# Patient Record
Sex: MALE | Race: OTHER | HISPANIC OR LATINO | Employment: OTHER | URBAN - METROPOLITAN AREA
[De-identification: names, ages, dates, MRNs, and addresses within clinical notes are randomized per-mention and may not be internally consistent; named-entity substitution may affect disease eponyms.]

---

## 2021-11-16 ENCOUNTER — APPOINTMENT (EMERGENCY)
Dept: RADIOLOGY | Facility: HOSPITAL | Age: 66
End: 2021-11-16

## 2021-11-16 ENCOUNTER — HOSPITAL ENCOUNTER (EMERGENCY)
Facility: HOSPITAL | Age: 66
Discharge: HOME | End: 2021-11-16
Attending: EMERGENCY MEDICINE

## 2021-11-16 VITALS
RESPIRATION RATE: 18 BRPM | HEART RATE: 61 BPM | HEIGHT: 66 IN | WEIGHT: 176 LBS | TEMPERATURE: 98.1 F | SYSTOLIC BLOOD PRESSURE: 146 MMHG | DIASTOLIC BLOOD PRESSURE: 71 MMHG | BODY MASS INDEX: 28.28 KG/M2 | OXYGEN SATURATION: 97 %

## 2021-11-16 DIAGNOSIS — M54.9 BACK PAIN: Primary | ICD-10-CM

## 2021-11-16 PROCEDURE — 72100 X-RAY EXAM L-S SPINE 2/3 VWS: CPT

## 2021-11-16 PROCEDURE — 96372 THER/PROPH/DIAG INJ SC/IM: CPT

## 2021-11-16 PROCEDURE — 99284 EMERGENCY DEPT VISIT MOD MDM: CPT | Performed by: EMERGENCY MEDICINE

## 2021-11-16 PROCEDURE — 99283 EMERGENCY DEPT VISIT LOW MDM: CPT

## 2021-11-16 RX ORDER — KETOROLAC TROMETHAMINE 30 MG/ML
15 INJECTION, SOLUTION INTRAMUSCULAR; INTRAVENOUS ONCE
Status: COMPLETED | OUTPATIENT
Start: 2021-11-16 | End: 2021-11-16

## 2021-11-16 RX ORDER — PREDNISONE 20 MG/1
40 TABLET ORAL ONCE
Status: COMPLETED | OUTPATIENT
Start: 2021-11-16 | End: 2021-11-16

## 2021-11-16 RX ORDER — PREDNISONE 20 MG/1
40 TABLET ORAL DAILY
Qty: 10 TABLET | Refills: 0 | Status: SHIPPED | OUTPATIENT
Start: 2021-11-16 | End: 2021-11-21

## 2021-11-16 RX ORDER — IBUPROFEN 800 MG/1
800 TABLET ORAL 3 TIMES DAILY
Qty: 21 TABLET | Refills: 0 | Status: SHIPPED | OUTPATIENT
Start: 2021-11-16

## 2021-11-16 RX ADMIN — PREDNISONE 40 MG: 20 TABLET ORAL at 18:08

## 2021-11-16 RX ADMIN — KETOROLAC TROMETHAMINE 15 MG: 30 INJECTION, SOLUTION INTRAMUSCULAR at 18:08

## 2021-11-18 ENCOUNTER — TELEPHONE (OUTPATIENT)
Dept: PHYSICAL THERAPY | Facility: OTHER | Age: 66
End: 2021-11-18

## 2021-11-22 ENCOUNTER — TELEPHONE (OUTPATIENT)
Dept: PHYSICAL THERAPY | Facility: OTHER | Age: 66
End: 2021-11-22

## 2022-10-25 ENCOUNTER — OFFICE VISIT (OUTPATIENT)
Dept: URGENT CARE | Facility: CLINIC | Age: 67
End: 2022-10-25

## 2022-10-25 VITALS
HEIGHT: 66 IN | HEART RATE: 72 BPM | SYSTOLIC BLOOD PRESSURE: 135 MMHG | BODY MASS INDEX: 28.28 KG/M2 | RESPIRATION RATE: 19 BRPM | OXYGEN SATURATION: 96 % | DIASTOLIC BLOOD PRESSURE: 85 MMHG | TEMPERATURE: 98.1 F | WEIGHT: 176 LBS

## 2022-10-25 DIAGNOSIS — M54.42 CHRONIC BILATERAL LOW BACK PAIN WITH BILATERAL SCIATICA: Primary | ICD-10-CM

## 2022-10-25 DIAGNOSIS — M54.41 CHRONIC BILATERAL LOW BACK PAIN WITH BILATERAL SCIATICA: Primary | ICD-10-CM

## 2022-10-25 DIAGNOSIS — G89.29 CHRONIC BILATERAL LOW BACK PAIN WITH BILATERAL SCIATICA: Primary | ICD-10-CM

## 2022-10-25 PROCEDURE — 99203 OFFICE O/P NEW LOW 30 MIN: CPT | Performed by: EMERGENCY MEDICINE

## 2022-10-25 RX ORDER — CYCLOBENZAPRINE HCL 5 MG
5 TABLET ORAL
Qty: 10 TABLET | Refills: 0 | Status: SHIPPED | OUTPATIENT
Start: 2022-10-25

## 2022-10-25 RX ORDER — GABAPENTIN 100 MG/1
100 CAPSULE ORAL 3 TIMES DAILY
Qty: 90 CAPSULE | Refills: 0 | Status: SHIPPED | OUTPATIENT
Start: 2022-10-25 | End: 2022-11-24

## 2022-10-25 RX ORDER — PREDNISONE 10 MG/1
TABLET ORAL
Qty: 40 TABLET | Refills: 0 | Status: SHIPPED | OUTPATIENT
Start: 2022-10-25

## 2022-10-25 RX ORDER — KETOROLAC TROMETHAMINE 30 MG/ML
15 INJECTION, SOLUTION INTRAMUSCULAR; INTRAVENOUS ONCE
Status: COMPLETED | OUTPATIENT
Start: 2022-10-25 | End: 2022-10-25

## 2022-10-25 RX ADMIN — KETOROLAC TROMETHAMINE 15 MG: 30 INJECTION, SOLUTION INTRAMUSCULAR; INTRAVENOUS at 13:34

## 2022-10-25 NOTE — PATIENT INSTRUCTIONS
Follow-up with comprehensive Spine program in 2-3 days - call the [facility] in [place] for an appointment  Ice 20 minutes at a time 3 to 4 times a day to low back and buttocks region  Take medicines as prescribed  Use a walker as needed  Call physical therapy place near you to schedule physical therapy 2 to 3 times a week for 6 weeks  If symptoms get worse, proceed immediately to ER

## 2022-10-25 NOTE — PROGRESS NOTES
[facility]        NAME: Claudean Kell is a 78 y o  male  : 1955    MRN: [id number]  DATE: 2022  TIME: 5:42 PM    Assessment and Plan   Chronic bilateral low back pain with bilateral sciatica [M54 42, M54 41, G89 29]  1  Chronic bilateral low back pain with bilateral sciatica  ketorolac (TORADOL) injection 15 mg    gabapentin (Neurontin) 100 mg capsule    cyclobenzaprine (FLEXERIL) 5 mg tablet    predniSONE 10 mg tablet    Walker     Patient was given an injection of IM Toradol  Patient denies any kidney problems or bleeding issues  Patient was not on any blood thinners  Patient was given a walker to assist with his ambulation  The  via the iPad was extremely helpful  Patient Instructions   Patient Instructions   1  Follow-up with comprehensive Spine program in 2-3 days - call the [facility] for an appointment  2  Ice 20 minutes at a time 3 to 4 times a day to low back and buttocks region  3  Take medicines as prescribed  4  Use a walker as needed  5  Call physical therapy place near you to schedule physical therapy 2 to 3 times a week for 6 weeks  6  If symptoms get worse, proceed immediately to ER  Follow up with PCP in 3-5 days  Proceed to  ER if symptoms worsen  Chief Complaint     Chief Complaint   Patient presents with   • Pain     Pt  went to [facility] for spinal surgery back in May 2022, for 2 herniated discs  Since then he has been unstable on his feet and constantly falling  Feels numbness and pain through spine, down bilateral legs into feet  Has not seen any other doctors since coming back to [place] in October  Pt is [place]ese speaking and an interpretor cart was used  History of Present Illness       19-year-old Croatian-speaking male presents with a chief complaint of pain in his low back radiating down his buttocks and down both of his legs into the soles of his feet    Patient went to [facility] in May and had spinal surgery at length to the [facility] and is now having increased pain in his back, buttocks and legs  Patient complains of numbness behind his calves radiating to the soles of his feet  Patient is not taking any medications  Patient only did 1 week ago physical therapy after his surgery  History was obtained through the  via iPad  Patient states he is having difficulty ambulating and sometimes leads to 1 side  Patient denies any stroke symptoms such as decreased vision difficulty speaking or weakness on 1 side versus the other  Patient denies any difficulty urinating and denies any incontinence from bladder and bowel  Patient states sometimes he will have rectal pain when the pain in the buttocks starts  Patient states prior to surgery he was having a lot of difficulty walking  Review of Systems   Review of Systems   Constitutional: Negative for chills and fever  HENT: Negative for congestion and rhinorrhea  Eyes: Negative for discharge and visual disturbance  Respiratory: Negative for shortness of breath and wheezing  Cardiovascular: Negative for chest pain and palpitations  Gastrointestinal: Negative for abdominal pain and vomiting  Endocrine: Negative for polydipsia and polyuria  Genitourinary: Negative for dysuria and hematuria  Musculoskeletal: Positive for arthralgias, back pain and gait problem  Negative for neck stiffness  Skin: Negative for rash and wound  Neurological: Negative for dizziness and headaches  Psychiatric/Behavioral: Negative for confusion and suicidal ideas           Current Medications       Current Outpatient Medications:   •  cyclobenzaprine (FLEXERIL) 5 mg tablet, Take 1 tablet (5 mg total) by mouth daily at bedtime, Disp: 10 tablet, Rfl: 0  •  gabapentin (Neurontin) 100 mg capsule, Take 1 capsule (100 mg total) by mouth 3 (three) times a day For numbness down the legs, Disp: 90 capsule, Rfl: 0  •  predniSONE 10 mg tablet, Take 4 pills x4 days, then 3 pills x4 days, then 2 pills x4 days, and then 1 pill x4 days, Disp: 40 tablet, Rfl: 0  •  ibuprofen (MOTRIN) 800 mg tablet, Take 1 tablet (800 mg total) by mouth 3 (three) times a day (Patient not taking: Reported on 10/25/2022), Disp: 21 tablet, Rfl: 0  No current facility-administered medications for this visit  Current Allergies     Allergies as of 10/25/2022   • (No Known Allergies)            The following portions of the patient's history were reviewed and updated as appropriate: allergies, current medications, past family history, past medical history, past social history, past surgical history and problem list      History reviewed  No pertinent past medical history  Past Surgical History:   Procedure Laterality Date   • SPINE SURGERY      herniated discs       History reviewed  No pertinent family history  Medications have been verified  Objective   /85   Pulse 72   Temp 98 1 °F (36 7 °C)   Resp 19   Ht 5' 6" (1 676 m)   Wt 79 8 kg (176 lb)   SpO2 96%   BMI 28 41 kg/m²        Physical Exam     Physical Exam  Vitals and nursing note reviewed  Constitutional:       Appearance: Normal appearance  Comments: 70-year-old male sitting in a wheelchair complaining of low back pain radiating to his buttocks and down both legs  HENT:      Head: Normocephalic and atraumatic  Nose: Nose normal    Eyes:      Extraocular Movements: Extraocular movements intact  Conjunctiva/sclera: Conjunctivae normal    Cardiovascular:      Rate and Rhythm: Normal rate  Pulmonary:      Effort: Pulmonary effort is normal    Musculoskeletal:         General: Normal range of motion  Cervical back: Neck supple  Comments: Patient has tenderness to the lower lumbar region and bilateral sciatic regions radiating down both legs  Pt  has pain when he elevates both legs  Patient is able to ambulate but leans forward and walks very slowly and short distances    Patient has numbness down both legs and into the soles of his feet  Patient states that all his toes are numb  Skin:     General: Skin is warm and dry  Comments: There is a 2 in linear incision to the mid back of the upper lumbar region that was due to his spinal surgery and is healing well  There is no signs of infection  Neurological:      General: No focal deficit present  Mental Status: He is alert and oriented to person, place, and time     Psychiatric:         Mood and Affect: Mood normal

## 2022-12-02 ENCOUNTER — EVALUATION (OUTPATIENT)
Dept: PHYSICAL THERAPY | Facility: CLINIC | Age: 67
End: 2022-12-02

## 2022-12-02 DIAGNOSIS — G89.29 CHRONIC BILATERAL LOW BACK PAIN WITHOUT SCIATICA: Primary | ICD-10-CM

## 2022-12-02 DIAGNOSIS — M54.50 CHRONIC BILATERAL LOW BACK PAIN WITHOUT SCIATICA: Primary | ICD-10-CM

## 2022-12-02 NOTE — LETTER
2022    Jorge Roman MD  [address] [facility] [address]    Patient: Demarco Mtz   YOB: 1955   Date of Visit: 2022     Encounter Diagnosis     ICD-10-CM    1  Chronic bilateral low back pain without sciatica  M54 50     G89 29           Dear Dr Juwan Daly: Thank you for your recent referral of Demarco Serum  Please review the attached evaluation summary from Armando's recent visit  Please verify that you agree with the plan of care by signing the attached order  If you have any questions or concerns, please do not hesitate to call  I sincerely appreciate the opportunity to share in the care of one of your patients and hope to have another opportunity to work with you in the near future  Sincerely,    Rita Medellin, PT      Referring Provider:      I certify that I have read the below Plan of Care and certify the need for these services furnished under this plan of treatment while under my care  Jorge Roman MD  [address] [facility] [address]  Via Fax: [phone]          PT Evaluation     Today's date: 2022  Patient name: Demarco Mtz  : 1955  MRN: [id number]  Referring provider: No ref  provider found  Dx:   Encounter Diagnosis     ICD-10-CM    1  Chronic bilateral low back pain without sciatica  M54 50     G89 29                      Assessment  Assessment details: Demarco Mtz is a 75 y o  male who presents to PT with primary c/o low back pain that has been a chronic issue, with hx of a spinae surgery about 6 months ago (unsure if fusion vs laminectomy)  Sister present for language translation  He presents today with decreased lumbar AROM, decreased core/glute strength, BLE symptoms with lumbar extension  Findings result in decreased standing tolerance, decreased activity tolerance, decreased recreational tolerance   He would benefit from skilled PT to address these in order to restore PLOF  Pt educated on related anatomy, posture/positioning, symptom presentation, findings, POC and verbalizes understanding  HEP provided this date and pt verbalizes understanding  They leave this IE with all current questions answered to their satisfaction  Impairments: abnormal gait, abnormal or restricted ROM, activity intolerance, impaired physical strength, lacks appropriate home exercise program, pain with function, poor posture  and poor body mechanics    Symptom irritability: moderateBarriers to therapy: language  Understanding of Dx/Px/POC: good   Prognosis: good    Goals  STG-in 4 weeks  1  Pain at worst 6/10  2  Pt able to walk community distances without BLE symptoms  3  Independent with basic HEP    LTG-by DC  1  Pain at worst 4/10  2  Lumbar AROM WFL and pain free  3  Decreased BLE symptoms by 50%  4  independent with comprehensive HEP  5   Increase FOTO to >/= expected by DC    Plan  Patient would benefit from: skilled physical therapy  Planned modality interventions: biofeedback, cryotherapy, electrical stimulation/Russian stimulation, iontophoresis, unattended electrical stimulation, ultrasound, traction, thermotherapy: paraffin bath, thermotherapy: hydrocollator packs, TENS and low level laser therapy  Planned therapy interventions: aquatic therapy, balance, body mechanics training, coordination, flexibility, functional ROM exercises, gait training, graded exercise, home exercise program, therapeutic exercise, therapeutic activities, stretching, strengthening, postural training, patient education, neuromuscular re-education, manual therapy and joint mobilization  Frequency: 2x week  Duration in weeks: 8  Plan of Care beginning date: 12/2/2022  Plan of Care expiration date: 1/27/2023  Treatment plan discussed with: patient        Subjective Evaluation    History of Present Illness  Mechanism of injury: Dairn Naranjo is a 75 y o  male who presents to PT with primary c/o low back pain  His sister is here for translation as pt primarily speaks [place]  Does have hx of spine surgery about 6 months ago  Still having some back pain now  Does c/o numbness/tingling in BLE into feet that is constant  Notes when sitting, pain goes into B buttocks  Notes it feels better when he is walking around, but after while his legs get heavy and become uncomfortable  Denies sleep disturbance  Did have some PT prior to surgery, but none since     Pain  Current pain ratin  At best pain ratin  At worst pain rating: 10  Location: L/S  Quality: dull ache and radiating  Relieving factors: change in position  Aggravating factors: standing, walking and sitting  Progression: no change    Treatments  Current treatment: physical therapy  Patient Goals  Patient goals for therapy: decreased edema, decreased pain, improved balance, increased motion, increased strength, independence with ADLs/IADLs and return to sport/leisure activities  Patient goal: restore PLOF        Objective     Postural Observations  Seated posture: fair  Standing posture: fair        Active Range of Motion     Lumbar   Flexion: 50 degrees   Extension: 10 degrees  with pain  Left lateral flexion: 20 degrees    with pain  Right lateral flexion: 10 degrees  with pain    Additional Active Range of Motion Details  Increased BLE sx with lumbar extension    Strength/Myotome Testing     Left Hip   Planes of Motion   Flexion: 4+  Extension: 4  Abduction: 4+  Adduction: 4+    Right Hip   Planes of Motion   Flexion: 4+  Extension: 4  Abduction: 4+  Adduction: 4+    Left Knee   Flexion: 4+  Extension: 4+    Right Knee   Flexion: 4+  Extension: 4+    General Comments:      Lumbar Comments  Decreased core/glute strength            Precautions: hx spins surgery about 6 months ago (unsure what kind)      RE:   EPOC:              Manuals                                                                 Neuro Re-Ed Ther Ex             Bike for ROM             TrA brace 3"x10            HL hip add 3"x10            HL hip abd RTB 3"x10            SLR             Lumbar rollout             Row/LPD             pallof press             STS                                       Pt edu/HEP MS            Ther Activity                                       Gait Training                                       Modalities

## 2022-12-02 NOTE — PROGRESS NOTES
PT Evaluation     Today's date: 2022  Patient name: Kaykay Avila  : 1955  MRN: [id number]  Referring provider: No ref  provider found  Dx:   Encounter Diagnosis     ICD-10-CM    1  Chronic bilateral low back pain without sciatica  M54 50     G89 29                      Assessment  Assessment details: Kaykay Avila is a 75 y o  male who presents to PT with primary c/o low back pain that has been a chronic issue, with hx of a spinae surgery about 6 months ago (unsure if fusion vs laminectomy)  Sister present for language translation  He presents today with decreased lumbar AROM, decreased core/glute strength, BLE symptoms with lumbar extension  Findings result in decreased standing tolerance, decreased activity tolerance, decreased recreational tolerance  He would benefit from skilled PT to address these in order to restore PLOF  Pt educated on related anatomy, posture/positioning, symptom presentation, findings, POC and verbalizes understanding  HEP provided this date and pt verbalizes understanding  They leave this IE with all current questions answered to their satisfaction  Impairments: abnormal gait, abnormal or restricted ROM, activity intolerance, impaired physical strength, lacks appropriate home exercise program, pain with function, poor posture  and poor body mechanics    Symptom irritability: moderateBarriers to therapy: language  Understanding of Dx/Px/POC: good   Prognosis: good    Goals  STG-in 4 weeks  1  Pain at worst 6/10  2  Pt able to walk community distances without BLE symptoms  3  Independent with basic HEP    LTG-by DC  1  Pain at worst 4/10  2  Lumbar AROM WFL and pain free  3  Decreased BLE symptoms by 50%  4  independent with comprehensive HEP  5   Increase FOTO to >/= expected by DC    Plan  Patient would benefit from: skilled physical therapy  Planned modality interventions: biofeedback, cryotherapy, electrical stimulation/Russian stimulation, iontophoresis, unattended electrical stimulation, ultrasound, traction, thermotherapy: paraffin bath, thermotherapy: hydrocollator packs, TENS and low level laser therapy  Planned therapy interventions: aquatic therapy, balance, body mechanics training, coordination, flexibility, functional ROM exercises, gait training, graded exercise, home exercise program, therapeutic exercise, therapeutic activities, stretching, strengthening, postural training, patient education, neuromuscular re-education, manual therapy and joint mobilization  Frequency: 2x week  Duration in weeks: 8  Plan of Care beginning date: 2022  Plan of Care expiration date: 2023  Treatment plan discussed with: patient        Subjective Evaluation    History of Present Illness  Mechanism of injury: Estella Eisenmenger is a 75 y o  male who presents to PT with primary c/o low back pain  His sister is here for translation as pt primarily speaks [facility] [place]  Does have hx of spine surgery about 6 months ago  Still having some back pain now  Does c/o numbness/tingling in BLE into feet that is constant  Notes when sitting, pain goes into B buttocks  Notes it feels better when he is walking around, but after while his legs get heavy and become uncomfortable  Denies sleep disturbance  Did have some PT prior to surgery, but none since     Pain  Current pain ratin  At best pain ratin  At worst pain rating: 10  Location: L/S  Quality: dull ache and radiating  Relieving factors: change in position  Aggravating factors: standing, walking and sitting  Progression: no change    Treatments  Current treatment: physical therapy  Patient Goals  Patient goals for therapy: decreased edema, decreased pain, improved balance, increased motion, increased strength, independence with ADLs/IADLs and return to sport/leisure activities  Patient goal: restore PLOF        Objective     Postural Observations  Seated posture: fair  Standing posture: fair        Active Range of Motion     Lumbar   Flexion: 50 degrees   Extension: 10 degrees  with pain  Left lateral flexion: 20 degrees    with pain  Right lateral flexion: 10 degrees  with pain    Additional Active Range of Motion Details  Increased BLE sx with lumbar extension    Strength/Myotome Testing     Left Hip   Planes of Motion   Flexion: 4+  Extension: 4  Abduction: 4+  Adduction: 4+    Right Hip   Planes of Motion   Flexion: 4+  Extension: 4  Abduction: 4+  Adduction: 4+    Left Knee   Flexion: 4+  Extension: 4+    Right Knee   Flexion: 4+  Extension: 4+    General Comments:      Lumbar Comments  Decreased core/glute strength             Precautions: hx spins surgery about 6 months ago (unsure what kind)      RE: 12/30  EPOC: 1/27             Manuals 12/2                                                                Neuro Re-Ed                                                                                                        Ther Ex             Bike for ROM             TrA brace 3"x10            HL hip add 3"x10            HL hip abd RTB 3"x10            SLR             Lumbar rollout             Row/LPD             pallof press             STS                                       Pt edu/HEP MS            Ther Activity                                       Gait Training                                       Modalities

## 2022-12-07 ENCOUNTER — OFFICE VISIT (OUTPATIENT)
Dept: PHYSICAL THERAPY | Facility: CLINIC | Age: 67
End: 2022-12-07

## 2022-12-07 DIAGNOSIS — M54.50 CHRONIC BILATERAL LOW BACK PAIN WITHOUT SCIATICA: Primary | ICD-10-CM

## 2022-12-07 DIAGNOSIS — G89.29 CHRONIC BILATERAL LOW BACK PAIN WITHOUT SCIATICA: Primary | ICD-10-CM

## 2022-12-07 NOTE — PROGRESS NOTES
Daily Note     Today's date: 2022  Patient name: Rigo Billings  : 1955  MRN: [id number]  Referring provider: Cholo Hdez MD  Dx:   Encounter Diagnosis     ICD-10-CM    1  Chronic bilateral low back pain without sciatica  M54 50     G89 29                      Subjective: Pt states he has only a little bit of pain today      Objective: See treatment diary below      Assessment: Tolerated treatment well  Patient demonstrated fatigue post treatment and would benefit from continued PT  TEP initiated this date as outlined below with focus on spinal stability training/core strengthening  No c/o pain throughout, nor observed  Pt denies any pain upon conclusion of session  Plan to assess response next session and progress as tolerated  Plan: Continue per plan of care  Progress treatment as tolerated         Precautions: hx spine surgery about 6 months ago (unsure what kind)      RE:   EPOC:              Manuals                                                                Neuro Re-Ed                                                                                                        Ther Ex             Bike for ROM  5' L1           TrA brace 3"x10 3"x10           HL hip add 3"x10 5"x20           HL hip abd RTB 3"x10 RTB 5"x20 ea           SLR  2x10 ea           Lumbar rollout  1 way only 10"x10           Row/LPD  GTB 2x10 ea           pallof press             STS  nv                                     Pt edu/HEP MS            Ther Activity                                       Gait Training                                       Modalities

## 2022-12-09 ENCOUNTER — OFFICE VISIT (OUTPATIENT)
Dept: PHYSICAL THERAPY | Facility: CLINIC | Age: 67
End: 2022-12-09

## 2022-12-09 DIAGNOSIS — M54.50 CHRONIC BILATERAL LOW BACK PAIN WITHOUT SCIATICA: Primary | ICD-10-CM

## 2022-12-09 DIAGNOSIS — G89.29 CHRONIC BILATERAL LOW BACK PAIN WITHOUT SCIATICA: Primary | ICD-10-CM

## 2022-12-09 NOTE — PROGRESS NOTES
Daily Note     Today's date: 2022  Patient name: Safia Gatica  : 1955  MRN: [id number]  Referring provider: Dennis Haddad MD  Dx:   Encounter Diagnosis     ICD-10-CM    1  Chronic bilateral low back pain without sciatica  M54 50     G89 29                      Subjective: Pt states he only has a little bit of back pain today      Objective: See treatment diary below      Assessment: Tolerated treatment well  Patient demonstrated fatigue post treatment and would benefit from continued PT  Able to add in STS for functional strengthening and standing hip 3 way for core stability with BLE movement  Pt does respond well to verbal and visual cueing for proper form  No complaints offered or observed throughout  Progress as tolerated  Plan: Continue per plan of care  Progress treatment as tolerated         Precautions: hx spine surgery about 6 months ago (unsure what kind)      RE:   EPOC:              Manuals                                                               Neuro Re-Ed                                                                                                        Ther Ex             Bike for ROM  5' L1 5'           TrA brace 3"x10 3"x10 3"x20          HL hip add 3"x10 5"x20 5"x20          HL hip abd RTB 3"x10 RTB 5"x20 ea RTB 5"x20 ea          SLR  2x10 ea 2x10 ea          HS stretch   W/ strap 30"x4          Lumbar rollout  1 way only 10"x10 1 way 10"x10          Row/LPD  GTB 2x10 ea GTB 3x10 ea          pallof press   nv          STS  nv lowmat 2x10          Standing hip 3 way   x10 ea                       Pt edu/HEP MS            Ther Activity                                       Gait Training                                       Modalities

## 2022-12-14 ENCOUNTER — OFFICE VISIT (OUTPATIENT)
Dept: PHYSICAL THERAPY | Facility: CLINIC | Age: 67
End: 2022-12-14

## 2022-12-14 DIAGNOSIS — M54.50 CHRONIC BILATERAL LOW BACK PAIN WITHOUT SCIATICA: Primary | ICD-10-CM

## 2022-12-14 DIAGNOSIS — G89.29 CHRONIC BILATERAL LOW BACK PAIN WITHOUT SCIATICA: Primary | ICD-10-CM

## 2022-12-14 NOTE — PROGRESS NOTES
Daily Note     Today's date: 2022  Patient name: Rafiq Frost  : 1955  MRN: [id number]  Referring provider: Shereen Uriarte MD  Dx:   Encounter Diagnosis     ICD-10-CM    1  Chronic bilateral low back pain without sciatica  M54 50     G89 29                      Subjective: Pt with no new reports      Objective: See treatment diary below      Assessment: Tolerated treatment well  Patient demonstrated fatigue post treatment and would benefit from continued PT  Addition of pallof press for anti-rotary stability  Pt able to exhibit improved core activation throughout session with decreased cueing required  Good tolerance to progressions of increased reps  Continue to progress as able  Plan: Continue per plan of care  Progress treatment as tolerated         Precautions: hx spine surgery about 6 months ago (unsure what kind)      RE:   EPOC:              Manuals                                                              Neuro Re-Ed                                                                                                        Ther Ex             Bike for ROM  5' L1 5'  5'         TrA brace 3"x10 3"x10 3"x20 3"x20         HL hip add 3"x10 5"x20 5"x20 5"x30         HL hip abd RTB 3"x10 RTB 5"x20 ea RTB 5"x20 ea GTB 5"x20 ea         SLR  2x10 ea 2x10 ea 2x10 ea         HS stretch   W/ strap 30"x4 W/ strap 30"X4         Lumbar rollout  1 way only 10"x10 1 way 10"x10 1 way 10"x10         Row/LPD  GTB 2x10 ea GTB 3x10 ea GTB 3x10 ea         pallof press   nv 10"x10 ea 1 GTB         STS  nv lowmat 2x10 lowmat 2x10         Standing hip 3 way   x10 ea x10 ea, ea leg                      Pt edu/HEP MS            Ther Activity                                       Gait Training                                       Modalities

## 2022-12-16 ENCOUNTER — OFFICE VISIT (OUTPATIENT)
Dept: PHYSICAL THERAPY | Facility: CLINIC | Age: 67
End: 2022-12-16

## 2022-12-16 DIAGNOSIS — G89.29 CHRONIC BILATERAL LOW BACK PAIN WITHOUT SCIATICA: Primary | ICD-10-CM

## 2022-12-16 DIAGNOSIS — M54.50 CHRONIC BILATERAL LOW BACK PAIN WITHOUT SCIATICA: Primary | ICD-10-CM

## 2022-12-16 NOTE — PROGRESS NOTES
Daily Note     Today's date: 2022  Patient name: Timur Soto  : 1955  MRN: [id number]  Referring provider: Cherelle Zhao MD  Dx:   Encounter Diagnosis     ICD-10-CM    1  Chronic bilateral low back pain without sciatica  M54 50     G89 29           Start Time: 1228  Stop Time: 1313  Total time in clinic (min): 45 minutes    Subjective: patient denies any new incidents or complaints  Denies any LB pain  Objective: See treatment diary below      Assessment: patient required cueing throughout along with demonstration on exercise sequencing, positioning, and mechanics due to language barrier  Was able to complete without onset of reported pain    Plan: Continue per plan of care  Progress treatment as tolerated         Precautions: hx spine surgery about 6 months ago (unsure what kind)      RE:   EPOC:          Manuals                                         Neuro Re-Ed                                                                        Ther Ex         Bike for ROM  5' L1 5'  5' L1 x 5 mins    TrA brace 3"x10 3"x10 3"x20 3"x20 5"x 10    HL hip add 3"x10 5"x20 5"x20 5"x30 5"x20    HL hip abd RTB 3"x10 RTB 5"x20 ea RTB 5"x20 ea GTB 5"x20 ea GTB 5"x30    SLR  2x10 ea 2x10 ea 2x10 ea 2x10 ea     HS stretch   W/ strap 30"x4 W/ strap 30"X4 W/Strap 30"x4     Lumbar rollout  1 way only 10"x10 1 way 10"x10 1 way 10"x10     Row/LPD  GTB 2x10 ea GTB 3x10 ea GTB 3x10 ea GTB 3x10 ea     pallof press   nv 10"x10 ea 1 GTB x20 ea     STS  nv lowmat 2x10 lowmat 2x10 Low mat 2x10    Standing hip 3 way   x10 ea x10 ea, ea leg X10 ea BL             Pt edu/HEP MS        Ther Activity                           Gait Training                           Modalities

## 2022-12-19 ENCOUNTER — OFFICE VISIT (OUTPATIENT)
Dept: PHYSICAL THERAPY | Facility: CLINIC | Age: 67
End: 2022-12-19

## 2022-12-19 DIAGNOSIS — G89.29 CHRONIC BILATERAL LOW BACK PAIN WITHOUT SCIATICA: Primary | ICD-10-CM

## 2022-12-19 DIAGNOSIS — M54.50 CHRONIC BILATERAL LOW BACK PAIN WITHOUT SCIATICA: Primary | ICD-10-CM

## 2022-12-19 NOTE — PROGRESS NOTES
Daily Note     Today's date: 2022  Patient name: Marcello Schmidt  : 1955  MRN: [id number]  Referring provider: Albert Flynn MD  Dx:   Encounter Diagnosis     ICD-10-CM    1  Chronic bilateral low back pain without sciatica  M54 50     G89 29                      Subjective: Patient offers no new complaints  Objective: See treatment diary below      Assessment: Tolerated treatment well  Patient does well with demonstration exhibiting good carryover  No pain or discomfort reported during session  Patient demonstrated fatigue post treatment and would benefit from continued PT      Plan: Progress treatment as tolerated         Precautions: hx spine surgery about 6 months ago (unsure what kind)      RE:   EPOC:          Manuals                                        Neuro Re-Ed                                                                        Ther Ex        Bike for ROM  5' L1 5'  5' L1 x 5 mins L1 x 5 min   TrA brace 3"x10 3"x10 3"x20 3"x20 5"x 10 10"x10    TrA brace + marches      NV   HL hip add 3"x10 5"x20 5"x20 5"x30 5"x20 5"x30    HL hip abd RTB 3"x10 RTB 5"x20 ea RTB 5"x20 ea GTB 5"x20 ea GTB 5"x30 GTB 5"x20ea   SLR  2x10 ea 2x10 ea 2x10 ea 2x10 ea  2x10 ea   HS stretch   W/ strap 30"x4 W/ strap 30"X4 W/Strap 30"x4  W/ strap 30"x4    Lumbar rollout  1 way only 10"x10 1 way 10"x10 1 way 10"x10     Row/LPD  GTB 2x10 ea GTB 3x10 ea GTB 3x10 ea GTB 3x10 ea  BTB 2x10 ea    pallof press   nv 10"x10 ea 1 GTB x20 ea  1 GTB 5"x20    STS  nv lowmat 2x10 lowmat 2x10 Low mat 2x10 Low mat 2x10   Standing hip 3 way   x10 ea x10 ea, ea leg X10 ea BL X 10 ea bilat  YTB             Pt edu/HEP MS        Ther Activity                           Gait Training                           Modalities

## 2022-12-21 ENCOUNTER — OFFICE VISIT (OUTPATIENT)
Dept: PHYSICAL THERAPY | Facility: CLINIC | Age: 67
End: 2022-12-21

## 2022-12-21 DIAGNOSIS — G89.29 CHRONIC BILATERAL LOW BACK PAIN WITHOUT SCIATICA: Primary | ICD-10-CM

## 2022-12-21 DIAGNOSIS — M54.50 CHRONIC BILATERAL LOW BACK PAIN WITHOUT SCIATICA: Primary | ICD-10-CM

## 2022-12-28 ENCOUNTER — OFFICE VISIT (OUTPATIENT)
Dept: PHYSICAL THERAPY | Facility: CLINIC | Age: 67
End: 2022-12-28

## 2022-12-28 DIAGNOSIS — M54.50 CHRONIC BILATERAL LOW BACK PAIN WITHOUT SCIATICA: Primary | ICD-10-CM

## 2022-12-28 DIAGNOSIS — G89.29 CHRONIC BILATERAL LOW BACK PAIN WITHOUT SCIATICA: Primary | ICD-10-CM

## 2022-12-28 NOTE — PROGRESS NOTES
Daily Note     Today's date: 2022  Patient name: Yvonne Cronin  : 1955  MRN: [id number]  Referring provider: Ni Dugan MD  Dx:   Encounter Diagnosis     ICD-10-CM    1  Chronic bilateral low back pain without sciatica  M54 50     G89 29                      Subjective: Pt denies any back pain upon arrival       Objective: See treatment diary below      Assessment: Tolerated treatment well  Patient demonstrated fatigue post treatment and would benefit from continued PT  No complaints offered or observed throughout  Re-eval planned for next visit and discussed potential for DC if he is still feeling well  Plan: Continue per plan of care  Progress treatment as tolerated         Precautions: hx spine surgery about 6 months ago (unsure what kind)      RE:   EPOC:          Manuals                                        Neuro Re-Ed                                                                        Ther Ex        Bike for ROM L1 x5' L1 x5'  5' L1 x 5 mins L1 x 5 min   TrA brace 10"x10 10"x10  3"x20 5"x 10 10"x10    TrA brace + marches x10 ea LE x20 ea LE    NV   HL hip add 10"x15 10"x20  5"x30 5"x20 5"x30    HL hip abd GTB 5"x30 ea GTB 5"x30 ea  GTB 5"x20 ea GTB 5"x30 GTB 5"x20ea   SLR 2x10 ea 2x10 ea  2x10 ea 2x10 ea  2x10 ea   HS stretch W/ strap 30"x4   W/ strap 30"X4 W/Strap 30"x4  W/ strap 30"x4    Lumbar rollout    1 way 10"x10     Row/LPD BTB 3x10 ea BTB 3x10 ea  GTB 3x10 ea GTB 3x10 ea  BTB 2x10 ea    pallof press GTB 10"x10 ea   10"x10 ea 1 GTB x20 ea  1 GTB 5"x20    STS 2x10 lowmat 2x10 lowmat  lowmat 2x10 Low mat 2x10 Low mat 2x10   Standing hip 3 way YTB x15 ea, ea LE   x10 ea, ea leg X10 ea BL X 10 ea bilat  YTB    Resisted side step  RTB x3 laps       Pt edu/HEP         Ther Activity                           Gait Training                           Modalities

## 2022-12-30 ENCOUNTER — APPOINTMENT (OUTPATIENT)
Dept: PHYSICAL THERAPY | Facility: CLINIC | Age: 67
End: 2022-12-30

## 2022-12-30 NOTE — PROGRESS NOTES
PT Re-Evaluation     Today's date: 2022  Patient name: Zenobia Harris  : 1955  MRN: [id number]  Referring provider: Vidhya Cintron MD  Dx:   No diagnosis found  Assessment  Assessment details:  Re-Eval:      INITIAL:Armando Gómez is a 75 y o  male who presents to PT with primary c/o low back pain that has been a chronic issue, with hx of a spinae surgery about 6 months ago (unsure if fusion vs laminectomy)  Sister present for language translation  He presents today with decreased lumbar AROM, decreased core/glute strength, BLE symptoms with lumbar extension  Findings result in decreased standing tolerance, decreased activity tolerance, decreased recreational tolerance  He would benefit from skilled PT to address these in order to restore PLOF  Pt educated on related anatomy, posture/positioning, symptom presentation, findings, POC and verbalizes understanding  HEP provided this date and pt verbalizes understanding  They leave this IE with all current questions answered to their satisfaction  Impairments: abnormal gait, abnormal or restricted ROM, activity intolerance, impaired physical strength, lacks appropriate home exercise program, pain with function, poor posture  and poor body mechanics    Symptom irritability: moderateBarriers to therapy: language  Understanding of Dx/Px/POC: good   Prognosis: good    Goals  STG-in 4 weeks  1  Pain at worst 6/10  2  Pt able to walk community distances without BLE symptoms  3  Independent with basic HEP    LTG-by DC  1  Pain at worst 4/10  2  Lumbar AROM WFL and pain free  3  Decreased BLE symptoms by 50%  4  independent with comprehensive HEP  5   Increase FOTO to >/= expected by DC    Plan  Patient would benefit from: skilled physical therapy  Planned modality interventions: biofeedback, cryotherapy, electrical stimulation/Russian stimulation, iontophoresis, unattended electrical stimulation, ultrasound, traction, thermotherapy: paraffin bath, thermotherapy: hydrocollator packs, TENS and low level laser therapy  Planned therapy interventions: aquatic therapy, balance, body mechanics training, coordination, flexibility, functional ROM exercises, gait training, graded exercise, home exercise program, therapeutic exercise, therapeutic activities, stretching, strengthening, postural training, patient education, neuromuscular re-education, manual therapy and joint mobilization  Frequency: 2x week  Duration in weeks: 8  Plan of Care beginning date: 2022  Plan of Care expiration date: 2023  Treatment plan discussed with: patient        Subjective Evaluation    History of Present Illness  Mechanism of injury:  Re-Eval:      INITIAL:Armando Fierro is a 75 y o  male who presents to PT with primary c/o low back pain  His sister is here for translation as pt primarily speaks [address]  Does have hx of spine surgery about 6 months ago  Still having some back pain now  Does c/o numbness/tingling in BLE into feet that is constant  Notes when sitting, pain goes into B buttocks  Notes it feels better when he is walking around, but after while his legs get heavy and become uncomfortable  Denies sleep disturbance  Did have some PT prior to surgery, but none since     Pain  Current pain ratin  At best pain ratin  At worst pain rating: 10  Location: L/S  Quality: dull ache and radiating  Relieving factors: change in position  Aggravating factors: standing, walking and sitting  Progression: no change    Treatments  Current treatment: physical therapy  Patient Goals  Patient goals for therapy: decreased edema, decreased pain, improved balance, increased motion, increased strength, independence with ADLs/IADLs and return to sport/leisure activities  Patient goal: restore PLOF        Objective     Postural Observations  Seated posture: fair  Standing posture: fair        Active Range of Motion     Lumbar   Flexion: 50 degrees Extension: 10 degrees  with pain  Left lateral flexion: 20 degrees    with pain  Right lateral flexion: 10 degrees  with pain    Additional Active Range of Motion Details  Increased BLE sx with lumbar extension    Strength/Myotome Testing     Left Hip   Planes of Motion   Flexion: 4+  Extension: 4  Abduction: 4+  Adduction: 4+    Right Hip   Planes of Motion   Flexion: 4+  Extension: 4  Abduction: 4+  Adduction: 4+    Left Knee   Flexion: 4+  Extension: 4+    Right Knee   Flexion: 4+  Extension: 4+    General Comments:      Lumbar Comments  Decreased core/glute strength             Precautions: hx spins surgery about 6 months ago (unsure what kind)        RE: 12/30  EPOC: 1/27         Manuals 12/21 12/28 12/30                                          Neuro Re-Ed                                                                        Ther Ex         Bike for ROM L1 x5' L1 x5'       TrA brace 10"x10 10"x10       TrA brace + marches x10 ea LE x20 ea LE       HL hip add 10"x15 10"x20       HL hip abd GTB 5"x30 ea GTB 5"x30 ea       SLR 2x10 ea 2x10 ea       HS stretch W/ strap 30"x4        Lumbar rollout         Row/LPD BTB 3x10 ea BTB 3x10 ea       pallof press GTB 10"x10 ea        STS 2x10 lowmat 2x10 lowmat       Standing hip 3 way YTB x15 ea, ea LE        Resisted side step  RTB x3 laps       Pt edu/HEP         Ther Activity                           Gait Training                           Modalities

## 2023-01-04 ENCOUNTER — EVALUATION (OUTPATIENT)
Dept: PHYSICAL THERAPY | Facility: CLINIC | Age: 68
End: 2023-01-04

## 2023-01-04 DIAGNOSIS — M54.50 CHRONIC BILATERAL LOW BACK PAIN WITHOUT SCIATICA: Primary | ICD-10-CM

## 2023-01-04 DIAGNOSIS — G89.29 CHRONIC BILATERAL LOW BACK PAIN WITHOUT SCIATICA: Primary | ICD-10-CM

## 2023-01-04 NOTE — LETTER
2023    Gilda Watts MD  [address] [facility] [address]    Patient: Brittaney Barron   YOB: 1955   Date of Visit: 2023     Encounter Diagnosis     ICD-10-CM    1  Chronic bilateral low back pain without sciatica  M54 50     G89 29           Dear Dr Rodney Rubio: Thank you for your recent referral of Brittaney Barron  Please review the attached evaluation summary from Armando's recent visit  Please verify that you agree with the plan of care by signing the attached order  If you have any questions or concerns, please do not hesitate to call  I sincerely appreciate the opportunity to share in the care of one of your patients and hope to have another opportunity to work with you in the near future  Sincerely,    Neil Valera, PT      Referring Provider:      I certify that I have read the below Plan of Care and certify the need for these services furnished under this plan of treatment while under my care  Gilda Watts MD  [address] [facility] [address]  Via Fax: [phone]          PT Re-Evaluation  and PT Discharge    Today's date: 2023  Patient name: Brittaney Barron  : 1955  MRN: [id number]  Referring provider: Shannan Arndt MD  Dx:   Encounter Diagnosis     ICD-10-CM    1  Chronic bilateral low back pain without sciatica  M54 50     G89 29                      Assessment  Assessment details:  Re-Eval/DISCHARGE: Pt reports 90% improvement over his course of care  He denies any recent back pain, but still has continued B foot numbness  Improved lumbar AROM and core strength/BLE gross strength  Improved FOTO score to 29 from 1 at IE (with predicted score of 30) also indicative of functional improvements made  He is appropriate and agreeable to DC this date secondary to plateau in progress, goal achievement  DC to HEP    used during this to reduce miscommunication and improve pt understanding      INITIAL:Armando Morataya is a 75 y o  male who presents to PT with primary c/o low back pain that has been a chronic issue, with hx of a spinae surgery about 6 months ago (unsure if fusion vs laminectomy)  Sister present for language translation  He presents today with decreased lumbar AROM, decreased core/glute strength, BLE symptoms with lumbar extension  Findings result in decreased standing tolerance, decreased activity tolerance, decreased recreational tolerance  He would benefit from skilled PT to address these in order to restore PLOF  Pt educated on related anatomy, posture/positioning, symptom presentation, findings, POC and verbalizes understanding  HEP provided this date and pt verbalizes understanding  They leave this IE with all current questions answered to their satisfaction  Impairments: abnormal gait, abnormal or restricted ROM, activity intolerance, impaired physical strength, lacks appropriate home exercise program, pain with function, poor posture  and poor body mechanics    Symptom irritability: moderateBarriers to therapy: language  Understanding of Dx/Px/POC: good   Prognosis: good    Goals  STG-in 4 weeks  1  Pain at worst 6/10-met  2  Pt able to walk community distances without BLE symptoms-met  3  Independent with basic HEP-met    LTG-by DC  1  Pain at worst 4/10-met  2  Lumbar AROM WFL and pain free-met  3  Decreased BLE symptoms by 50%-ongoing  4  independent with comprehensive HEP-met  5   Increase FOTO to >/= expected by DC-mostly met    Plan  Plan details: DC 1/4/23  Patient would benefit from: skilled physical therapy  Planned modality interventions: biofeedback, cryotherapy, electrical stimulation/Russian stimulation, iontophoresis, unattended electrical stimulation, ultrasound, traction, thermotherapy: paraffin bath, thermotherapy: hydrocollator packs, TENS and low level laser therapy  Planned therapy interventions: aquatic therapy, balance, body mechanics training, coordination, flexibility, functional ROM exercises, gait training, graded exercise, home exercise program, therapeutic exercise, therapeutic activities, stretching, strengthening, postural training, patient education, neuromuscular re-education, manual therapy and joint mobilization  Frequency: 2x week  Duration in weeks: 8  Plan of Care beginning date: 2022  Plan of Care expiration date: 2023  Treatment plan discussed with: patient        Subjective Evaluation    History of Present Illness  Mechanism of injury:  Re-Eval: Pt denies any back pain as of late  Does still have some numbness/tingling in B feet  Reports he sees the physician again on  for f/u and will be getting an EMG 23  INITIAL:Armando Le is a 75 y o  male who presents to PT with primary c/o low back pain  His sister is here for translation as pt primarily speaks [address]  Does have hx of spine surgery about 6 months ago  Still having some back pain now  Does c/o numbness/tingling in BLE into feet that is constant  Notes when sitting, pain goes into B buttocks  Notes it feels better when he is walking around, but after while his legs get heavy and become uncomfortable  Denies sleep disturbance  Did have some PT prior to surgery, but none since     Pain  Current pain ratin  At best pain ratin  At worst pain ratin  Location: L/S  Quality: dull ache and radiating  Relieving factors: change in position  Aggravating factors: standing, walking and sitting  Progression: no change    Treatments  Current treatment: physical therapy  Patient Goals  Patient goals for therapy: decreased edema, decreased pain, improved balance, increased motion, increased strength, independence with ADLs/IADLs and return to sport/leisure activities  Patient goal: restore PLOF        Objective     Postural Observations  Seated posture: fair  Standing posture: fair        Active Range of Motion     Lumbar   Flexion: 90 degrees   Extension: 20 degrees   Left lateral flexion: 30 degrees       Right lateral flexion: 30 degrees     Strength/Myotome Testing     Left Hip   Planes of Motion   Flexion: 5  Extension: 4+  Abduction: 5  Adduction: 5    Right Hip   Planes of Motion   Flexion: 5  Extension: 4+  Abduction: 5  Adduction: 5    Left Knee   Flexion: 4+  Extension: 5    Right Knee   Flexion: 5  Extension: 5    General Comments:      Lumbar Comments  Decreased core/glute strength-improved            Precautions: hx spins surgery about 6 months ago (unsure what kind)        RE: 12/30  EPOC: 1/27         Manuals 12/21 12/28 1/4 RE/DC                                          Neuro Re-Ed                                                                        Ther Ex         Bike for ROM L1 x5' L1 x5'       TrA brace 10"x10 10"x10       TrA brace + marches x10 ea LE x20 ea LE       HL hip add 10"x15 10"x20       HL hip abd GTB 5"x30 ea GTB 5"x30 ea       SLR 2x10 ea 2x10 ea       HS stretch W/ strap 30"x4        Lumbar rollout         Row/LPD BTB 3x10 ea BTB 3x10 ea       pallof press GTB 10"x10 ea        STS 2x10 lowmat 2x10 lowmat       Standing hip 3 way YTB x15 ea, ea LE        Resisted side step  RTB x3 laps       Pt edu/HEP   MS-RE/DC, FOTO, subj/obj      Ther Activity                           Gait Training                           Modalities

## 2023-01-04 NOTE — PROGRESS NOTES
PT Re-Evaluation  and PT Discharge    Today's date: 2023  Patient name: Karyle Malone  : 1955  MRN: [id number]  Referring provider: Darren Zimmerman MD  Dx:   Encounter Diagnosis     ICD-10-CM    1  Chronic bilateral low back pain without sciatica  M54 50     G89 29                      Assessment  Assessment details:  Re-Eval/DISCHARGE: Pt reports 90% improvement over his course of care  He denies any recent back pain, but still has continued B foot numbness  Improved lumbar AROM and core strength/BLE gross strength  Improved FOTO score to 29 from 1 at IE (with predicted score of 30) also indicative of functional improvements made  He is appropriate and agreeable to DC this date secondary to plateau in progress, goal achievement  DC to HEP   used during this to reduce miscommunication and improve pt understanding      INITIAL:Armando Corea is a 75 y o  male who presents to PT with primary c/o low back pain that has been a chronic issue, with hx of a spinae surgery about 6 months ago (unsure if fusion vs laminectomy)  Sister present for language translation  He presents today with decreased lumbar AROM, decreased core/glute strength, BLE symptoms with lumbar extension  Findings result in decreased standing tolerance, decreased activity tolerance, decreased recreational tolerance  He would benefit from skilled PT to address these in order to restore PLOF  Pt educated on related anatomy, posture/positioning, symptom presentation, findings, POC and verbalizes understanding  HEP provided this date and pt verbalizes understanding  They leave this IE with all current questions answered to their satisfaction      Impairments: abnormal gait, abnormal or restricted ROM, activity intolerance, impaired physical strength, lacks appropriate home exercise program, pain with function, poor posture  and poor body mechanics    Symptom irritability: moderateBarriers to therapy: language  Understanding of Dx/Px/POC: good   Prognosis: good    Goals  STG-in 4 weeks  1  Pain at worst 6/10-met  2  Pt able to walk community distances without BLE symptoms-met  3  Independent with basic HEP-met    LTG-by DC  1  Pain at worst 4/10-met  2  Lumbar AROM WFL and pain free-met  3  Decreased BLE symptoms by 50%-ongoing  4  independent with comprehensive HEP-met  5  Increase FOTO to >/= expected by DC-mostly met    Plan  Plan details: DC 1/4/23  Patient would benefit from: skilled physical therapy  Planned modality interventions: biofeedback, cryotherapy, electrical stimulation/Russian stimulation, iontophoresis, unattended electrical stimulation, ultrasound, traction, thermotherapy: paraffin bath, thermotherapy: hydrocollator packs, TENS and low level laser therapy  Planned therapy interventions: aquatic therapy, balance, body mechanics training, coordination, flexibility, functional ROM exercises, gait training, graded exercise, home exercise program, therapeutic exercise, therapeutic activities, stretching, strengthening, postural training, patient education, neuromuscular re-education, manual therapy and joint mobilization  Frequency: 2x week  Duration in weeks: 8  Plan of Care beginning date: 12/2/2022  Plan of Care expiration date: 1/27/2023  Treatment plan discussed with: patient        Subjective Evaluation    History of Present Illness  Mechanism of injury: 1/4 Re-Eval: Pt denies any back pain as of late  Does still have some numbness/tingling in B feet  Reports he sees the physician again on 1/14 for f/u and will be getting an EMG 2/22/23  INITIAL:Armando Oliveros is a 75 y o  male who presents to PT with primary c/o low back pain  His sister is here for translation as pt primarily speaks [place]  Does have hx of spine surgery about 6 months ago  Still having some back pain now  Does c/o numbness/tingling in BLE into feet that is constant  Notes when sitting, pain goes into B buttocks  Notes it feels better when he is walking around, but after while his legs get heavy and become uncomfortable  Denies sleep disturbance  Did have some PT prior to surgery, but none since     Pain  Current pain ratin  At best pain ratin  At worst pain ratin  Location: L/S  Quality: dull ache and radiating  Relieving factors: change in position  Aggravating factors: standing, walking and sitting  Progression: no change    Treatments  Current treatment: physical therapy  Patient Goals  Patient goals for therapy: decreased edema, decreased pain, improved balance, increased motion, increased strength, independence with ADLs/IADLs and return to sport/leisure activities  Patient goal: restore PLOF        Objective     Postural Observations  Seated posture: fair  Standing posture: fair        Active Range of Motion     Lumbar   Flexion: 90 degrees   Extension: 20 degrees   Left lateral flexion: 30 degrees       Right lateral flexion: 30 degrees     Strength/Myotome Testing     Left Hip   Planes of Motion   Flexion: 5  Extension: 4+  Abduction: 5  Adduction: 5    Right Hip   Planes of Motion   Flexion: 5  Extension: 4+  Abduction: 5  Adduction: 5    Left Knee   Flexion: 4+  Extension: 5    Right Knee   Flexion: 5  Extension: 5    General Comments:      Lumbar Comments  Decreased core/glute strength-improved             Precautions: hx spins surgery about 6 months ago (unsure what kind)        RE:   EPOC:          Manuals  RE/DC                                          Neuro Re-Ed                                                                        Ther Ex         Bike for ROM L1 x5' L1 x5'       TrA brace 10"x10 10"x10       TrA brace + march x10 ea LE x20 ea LE       HL hip add 10"x15 10"x20       HL hip abd GTB 5"x30 ea GTB 5"x30 ea       SLR 2x10 ea 2x10 ea       HS stretch W/ strap 30"x4        Lumbar rollout         Row/LPD BTB 3x10 ea BTB 3x10 ea       pallof press GTB 10"x10 ea STS 2x10 lowmat 2x10 lowmat       Standing hip 3 way YTB x15 ea, ea LE        Resisted side step  RTB x3 laps       Pt edu/HEP   MS-RE/DC, FOTO, subj/obj      Ther Activity                           Gait Training                           Modalities

## 2023-01-06 ENCOUNTER — APPOINTMENT (OUTPATIENT)
Dept: PHYSICAL THERAPY | Facility: CLINIC | Age: 68
End: 2023-01-06

## 2023-01-11 ENCOUNTER — APPOINTMENT (OUTPATIENT)
Dept: PHYSICAL THERAPY | Facility: CLINIC | Age: 68
End: 2023-01-11

## 2023-01-13 ENCOUNTER — APPOINTMENT (OUTPATIENT)
Dept: PHYSICAL THERAPY | Facility: CLINIC | Age: 68
End: 2023-01-13

## 2023-02-27 NOTE — H&P (VIEW-ONLY)
Assessment:  1  Lumbar post-laminectomy syndrome    2  Paresthesia of both feet    3  Spinal stenosis of lumbar region with neurogenic claudication    4  History of fusion of lumbar spine    5  Chronic bilateral low back pain with bilateral sciatica    6  Demyelinating neuropathy        Plan:  Orders Placed This Encounter   Procedures   • X-ray lumbar spine 2 or 3 views     History of lumbar spine surgeon  MRI describes laminectomy at L3, however patient has a report that discusses surgery at L4-5  Please comment on whether there is laminectomy defect at L4     Standing Status:   Future     Standing Expiration Date:   3/1/2027     Scheduling Instructions:      Bring along any outside films relating to this procedure  • FL spine and pain procedure     Standing Status:   Future     Standing Expiration Date:   3/1/2027     Order Specific Question:   Reason for Exam:     Answer:   L5-S1 LESI     Order Specific Question:   Anticoagulant hold needed? Answer:   No   • Ambulatory Referral to Neurology     Standing Status:   Future     Standing Expiration Date:   3/1/2024     Referral Priority:   Routine     Referral Type:   Consult - AMB     Referral Reason:   Specialty Services Required     Requested Specialty:   Neurology     Number of Visits Requested:   1     Expiration Date:   3/1/2024       New Medications Ordered This Visit   Medications   • gabapentin (Neurontin) 300 mg capsule     Sig: Take 1 capsule (300 mg total) by mouth 3 (three) times a day You are currently taking 100 mg 3 times a day  You will substitute one 300 mg pill for one 100 mg pill every other day until you are taking 300mg three times a day  Dispense:  90 capsule     Refill:  1       My impressions and treatment recommendations were discussed in detail with the patient, who verbalized understanding and had no further questions      This is a 59-year-old [place]-speaking male who presents her office with chief complaint of pain in the bilateral feet with numbness and paresthesias  He has documented demyelinating polyneuropathy based on recent EMG with diminished sensation to light touch in lower extremities  Currently taking gabapentin 100 mg 3 times daily  I will increase him to 300 mg 3 times daily and also refer him to see neurology  He also has an issue of bilateral gluteal pain and leg weakness which is likely secondary to lumbar spinal stenosis  He has notable history of L3-4 fusion based MRI report  The surgery was done in [facility] and his operative report from the surgery that he brought with him mentions L4-5 surgery  We will order updated x-ray lumbar spine to help determine exact location of laminectomy sites  He has advanced spinal stenosis at the L2-3 and L4-5 levels  We discussed L5-S1 lumbar epidural steroid junction to help with his claudication type symptoms  I did mention to him that this will not help with his neuropathic pain  [id number] billing necessary due to increased time spent with patient as there was requirement of [place] speaking     [employer] [place] [employer] Program report was reviewed and was appropriate     Complete risks and benefits including bleeding, infection, tissue reaction, nerve injury and allergic reaction were discussed  The approach was demonstrated using models and literature was provided  Verbal and written consent was obtained  Discharge instructions were provided  I personally saw and examined the patient and I agree with the above discussed plan of care  History of Present Illness:    Bart Aragon is a 75 y o  male who presents to [facility] for initial evaluation of the above stated pain complaints  The patient has a past medical and chronic pain history as outlined in the assessment section  He was referred by Dr Villavicencio Overall    He reports pain in the gluteal muscles with walking and prolonged sitting   He reports notable pain in the bottom of the feet as well with numbness and tingling In both feet, especially on the right  Reports surgery helped with sciatica and burning in the legs  He is in physical therapy which has not helped with these issues  He reports that sometimes his legs feel heavy when he walks  Doesn't necessarily improve with sitting  He also has balance issues with walking which is because he cannot feel the ground when he walks  He had L3-4 fusion based on MRI results  This procedure was done in 12/2022 in [facility] and patient has paper saying the procedure was done at L4-5 which contradicts what he is saying  Review of Systems:    Review of Systems   Constitutional: Negative for fever and unexpected weight change  HENT: Negative for trouble swallowing  Eyes: Negative for visual disturbance  Respiratory: Negative for shortness of breath and wheezing  Cardiovascular: Positive for leg swelling  Negative for chest pain and palpitations  Gastrointestinal: Negative for constipation, diarrhea, nausea and vomiting  Endocrine: Positive for polyuria  Negative for cold intolerance and heat intolerance  Genitourinary: Negative for difficulty urinating and frequency  Musculoskeletal: Positive for myalgias  Negative for arthralgias, gait problem and joint swelling  Skin: Negative for rash  Neurological: Negative for dizziness, seizures, syncope, weakness and headaches  Hematological: Does not bruise/bleed easily  Psychiatric/Behavioral: Negative for dysphoric mood  All other systems reviewed and are negative  History reviewed  No pertinent past medical history  Past Surgical History:   Procedure Laterality Date   • SPINE SURGERY      herniated discs       History reviewed  No pertinent family history      Social History     Occupational History   • Not on file   Tobacco Use   • Smoking status: Never   • Smokeless tobacco: Never   Vaping Use   • Vaping Use: Never used Substance and Sexual Activity   • Alcohol use: Never   • Drug use: Never   • Sexual activity: Not on file         Current Outpatient Medications:   •  cyclobenzaprine (FLEXERIL) 5 mg tablet, Take 1 tablet (5 mg total) by mouth daily at bedtime, Disp: 10 tablet, Rfl: 0  •  gabapentin (Neurontin) 300 mg capsule, Take 1 capsule (300 mg total) by mouth 3 (three) times a day You are currently taking 100 mg 3 times a day  You will substitute one 300 mg pill for one 100 mg pill every other day until you are taking 300mg three times a day , Disp: 90 capsule, Rfl: 1  •  predniSONE 10 mg tablet, Take 4 pills x4 days, then 3 pills x4 days, then 2 pills x4 days, and then 1 pill x4 days, Disp: 40 tablet, Rfl: 0  •  ibuprofen (MOTRIN) 800 mg tablet, Take 1 tablet (800 mg total) by mouth 3 (three) times a day (Patient not taking: Reported on 10/25/2022), Disp: 21 tablet, Rfl: 0    No Known Allergies    Physical Exam:    /84 (BP Location: Right arm, Patient Position: Sitting, Cuff Size: Standard)   Pulse 65   Ht 5' 6" (1 676 m)   Wt 89 2 kg (196 lb 9 6 oz)   BMI 31 73 kg/m²     Constitutional: normal, well developed, well nourished, alert, in no distress and non-toxic and no overt pain behavior  Eyes: anicteric  HEENT: grossly intact  Neck: supple, symmetric, trachea midline and no masses   Pulmonary:even and unlabored  Cardiovascular:No edema or pitting edema present  Skin:Normal without rashes or lesions and well hydrated  Psychiatric:Mood and affect appropriate  Neurologic:Cranial Nerves II-XII grossly intact  Musculoskeletal:normal     Lumbar Spine Exam    Appearance:  Normal lordosis  Palpation/Tenderness:  no tenderness or spasm  Sensory:  Diminished Sensation to light touch in the bilateral feet  Motor Strength:  Antigravity in all myotomes of the bilateral lower extremities    Reflexes:  Left Patellar:  2+   Right Patellar:  2+   Left Achilles:  2+   Right Achilles:  2+     Imaging       12/29/2022  L-spine -- Magnetic Resonance   MRSPINE -- --     Impression    Impression:     1  Operative changes related to interval posterior fusion at L3-L4  2  Spondylosis contributing to varying degrees of spinal canal and foraminal   stenosis as described above including high-grade spinal canal stenosis at L2-L3   and L4-L5 and moderate bilateral foraminal stenosis at L3-L4  3  Stable grade 1 anterolisthesis of L4 over L5 which is presumably related to   facet arthropathy  Workstation:[id number]  Narrative    History: Low back pain, unspecified back pain laterality, unspecified   chronicity, unspecified whether sciatica present     Procedure: MRI of the lumbar spine was obtained with the following sequences:   Sagittal T1, sagittal T2, sagittal STIR and axial T2 weighted images  No   intravenous contrast      Comparison: Lumbar spine radiographs dated 8/11/2021     Findings: For the purposes of this dictation, the lumbar vertebrae are labeled   from a caudal to cranial direction, the first vertebra with lumbar morphology is   labeled as L5  Conus and lower thoracic cord: The conus terminates at the T12 level and is   unremarkable  The distal thoracic cord is normal in caliber and signal      Marrow and Alignment: Postsurgical changes are noted related to interval   laminectomy and posterior fusion at L3-L4 with paired rods and paired screws in   place  Artifact from hardware limits evaluation of adjacent structures  There is   exaggeration of the lumbar lordosis with mild left convex curvature of the   lumbar spine  Stable 6 mm anterolisthesis of L4 over L5  The vertebral bodies   are otherwise normal in height and alignment  No focal suspicious marrow signal   abnormality  Disc desiccation from L1-L2 through L4-L5 with mild disc space narrowing at   L1-L2 and L4-L5  Multilevel anterior endplate osteophyte formation  L1-L2: Minimal bulging annulus and osteophytic ridging  Ligamentum flavum   infolding  Mild spinal canal stenosis  Mild bilateral foraminal stenosis  L2-L3 : Broad disc bulge and osteophytic ridging with a central annular fissure  Ligamentum flavum infolding  Moderate to severe spinal canal stenosis  Mild bilateral foraminal stenosis  L3-L4: Laminectomy  Broad disc bulge and osteophytic ridging with a right   foraminal annular fissure  Mild to moderate spinal canal stenosis  Moderate   bilateral foraminal stenosis  L4-L5: Listhesis with uncovering of the disc  Mild disc bulge and osteophytic   ridging  Severe bilateral facet hypertrophy and ligamentum flavum infolding  Severe spinal canal stenosis  Mild bilateral foraminal stenosis  L5-S1: Mild disc bulge and osteophytic ridging  Bilateral facet hypertrophy  No   spinal canal stenosis  Mild right foraminal stenosis  No left foraminal   stenosis  Confluent STIR hyperintense signal in the bilateral lower lumbar and upper   sacral posterior paraspinal musculature is most likely posttreatment related  2 2 cm simple appearing fluid collection in the right aspect the laminectomy bed   at L3-L4 which is most likely benign  Procedure Note    Arcelia Grewal MD - 12/30/2022   Formatting of this note might be different from the original    History: Low back pain, unspecified back pain laterality, unspecified   chronicity, unspecified whether sciatica present     Procedure: MRI of the lumbar spine was obtained with the following sequences:   Sagittal T1, sagittal T2, sagittal STIR and axial T2 weighted images  No   intravenous contrast      Comparison: Lumbar spine radiographs dated 8/11/2021     Findings: For the purposes of this dictation, the lumbar vertebrae are labeled   from a caudal to cranial direction, the first vertebra with lumbar morphology is   labeled as L5  Conus and lower thoracic cord: The conus terminates at the T12 level and is   unremarkable   The distal thoracic cord is normal in caliber and signal  Marrow and Alignment: Postsurgical changes are noted related to interval   laminectomy and posterior fusion at L3-L4 with paired rods and paired screws in   place  Artifact from hardware limits evaluation of adjacent structures  There is   exaggeration of the lumbar lordosis with mild left convex curvature of the   lumbar spine  Stable 6 mm anterolisthesis of L4 over L5  The vertebral bodies   are otherwise normal in height and alignment  No focal suspicious marrow signal   abnormality  Disc desiccation from L1-L2 through L4-L5 with mild disc space narrowing at   L1-L2 and L4-L5  Multilevel anterior endplate osteophyte formation  L1-L2: Minimal bulging annulus and osteophytic ridging  Ligamentum flavum   infolding  Mild spinal canal stenosis  Mild bilateral foraminal stenosis  L2-L3 : Broad disc bulge and osteophytic ridging with a central annular fissure  Ligamentum flavum infolding  Moderate to severe spinal canal stenosis  Mild   bilateral foraminal stenosis  L3-L4: Laminectomy  Broad disc bulge and osteophytic ridging with a right   foraminal annular fissure  Mild to moderate spinal canal stenosis  Moderate   bilateral foraminal stenosis  L4-L5: Listhesis with uncovering of the disc  Mild disc bulge and osteophytic   ridging  Severe bilateral facet hypertrophy and ligamentum flavum infolding  Severe spinal canal stenosis  Mild bilateral foraminal stenosis  L5-S1: Mild disc bulge and osteophytic ridging  Bilateral facet hypertrophy  No   spinal canal stenosis  Mild right foraminal stenosis  No left foraminal   stenosis  Confluent STIR hyperintense signal in the bilateral lower lumbar and upper   sacral posterior paraspinal musculature is most likely posttreatment related  2 2 cm simple appearing fluid collection in the right aspect the laminectomy bed   at L3-L4 which is most likely benign  IMPRESSION:   Impression:     1   Operative changes related to interval posterior fusion at L3-L4  2  Spondylosis contributing to varying degrees of spinal canal and foraminal   stenosis as described above including high-grade spinal canal stenosis at L2-L3   and L4-L5 and moderate bilateral foraminal stenosis at L3-L4  3  Stable grade 1 anterolisthesis of L4 over L5 which is presumably related to   facet arthropathy  LUMBAR SPINE   11/16/2021     INDICATION:   low back pain      COMPARISON:  None     VIEWS:  XR SPINE LUMBAR 2 OR 3 VIEWS INJURY        FINDINGS:     There are 5 non rib bearing lumbar vertebral bodies       There is no evidence of acute fracture or destructive osseous lesion      L4 on L5 and L5 on S1 mild anterolisthesis appears secondary to facet arthropathy at each level       Age-appropriate lumbar degenerative changes are seen      The pedicles appear intact      Soft tissues are unremarkable      IMPRESSION:  Age-appropriate degenerative change with mild listheses as above    No acute findings        X-ray lumbar spine 2 or 3 views    (Results Pending)   FL spine and pain procedure    (Results Pending)       Orders Placed This Encounter   Procedures   • X-ray lumbar spine 2 or 3 views   • FL spine and pain procedure   • Ambulatory Referral to Neurology

## 2023-02-27 NOTE — PROGRESS NOTES
Assessment:  1  Lumbar post-laminectomy syndrome    2  Paresthesia of both feet    3  Spinal stenosis of lumbar region with neurogenic claudication    4  History of fusion of lumbar spine    5  Chronic bilateral low back pain with bilateral sciatica    6  Demyelinating neuropathy        Plan:  Orders Placed This Encounter   Procedures   • X-ray lumbar spine 2 or 3 views     History of lumbar spine surgeon  MRI describes laminectomy at L3, however patient has a report that discusses surgery at L4-5  Please comment on whether there is laminectomy defect at L4     Standing Status:   Future     Standing Expiration Date:   3/1/2027     Scheduling Instructions:      Bring along any outside films relating to this procedure  • FL spine and pain procedure     Standing Status:   Future     Standing Expiration Date:   3/1/2027     Order Specific Question:   Reason for Exam:     Answer:   L5-S1 LESI     Order Specific Question:   Anticoagulant hold needed? Answer:   No   • Ambulatory Referral to Neurology     Standing Status:   Future     Standing Expiration Date:   3/1/2024     Referral Priority:   Routine     Referral Type:   Consult - AMB     Referral Reason:   Specialty Services Required     Requested Specialty:   Neurology     Number of Visits Requested:   1     Expiration Date:   3/1/2024       New Medications Ordered This Visit   Medications   • gabapentin (Neurontin) 300 mg capsule     Sig: Take 1 capsule (300 mg total) by mouth 3 (three) times a day You are currently taking 100 mg 3 times a day  You will substitute one 300 mg pill for one 100 mg pill every other day until you are taking 300mg three times a day  Dispense:  90 capsule     Refill:  1       My impressions and treatment recommendations were discussed in detail with the patient, who verbalized understanding and had no further questions      This is a 74-year-old German-speaking male who presents her office with chief complaint of pain in the bilateral feet with numbness and paresthesias  He has documented demyelinating polyneuropathy based on recent EMG with diminished sensation to light touch in lower extremities  Currently taking gabapentin 100 mg 3 times daily  I will increase him to 300 mg 3 times daily and also refer him to see neurology  He also has an issue of bilateral gluteal pain and leg weakness which is likely secondary to lumbar spinal stenosis  He has notable history of L3-4 fusion based MRI report  The surgery was done in [facility] and his operative report from the surgery that he brought with him mentions L4-5 surgery  We will order updated x-ray lumbar spine to help determine exact location of laminectomy sites  He has advanced spinal stenosis at the L2-3 and L4-5 levels  We discussed L5-S1 lumbar epidural steroid junction to help with his claudication type symptoms  I did mention to him that this will not help with his neuropathic pain  [id number] billing necessary due to increased time spent with patient as there was requirement of Upper sor[place] speaking     [employer] [place] [employer] report was reviewed and was appropriate     Complete risks and benefits including bleeding, infection, tissue reaction, nerve injury and allergic reaction were discussed  The approach was demonstrated using models and literature was provided  Verbal and written consent was obtained  Discharge instructions were provided  I personally saw and examined the patient and I agree with the above discussed plan of care  History of Present Illness:    Hugo Nicole is a 75 y o  male who presents to [facility] for initial evaluation of the above stated pain complaints  The patient has a past medical and chronic pain history as outlined in the assessment section  He was referred by Dr Crispin Lezama    He reports pain in the gluteal muscles with walking and prolonged sitting   He reports notable pain in the bottom of the feet as well with numbness and tingling In both feet, especially on the right  Reports surgery helped with sciatica and burning in the legs  He is in physical therapy which has not helped with these issues  He reports that sometimes his legs feel heavy when he walks  Doesn't necessarily improve with sitting  He also has balance issues with walking which is because he cannot feel the ground when he walks  He had L3-4 fusion based on MRI results  This procedure was done in 12/2022 in [facility] and patient has paper saying the procedure was done at L4-5 which contradicts what he is saying  Review of Systems:    Review of Systems   Constitutional: Negative for fever and unexpected weight change  HENT: Negative for trouble swallowing  Eyes: Negative for visual disturbance  Respiratory: Negative for shortness of breath and wheezing  Cardiovascular: Positive for leg swelling  Negative for chest pain and palpitations  Gastrointestinal: Negative for constipation, diarrhea, nausea and vomiting  Endocrine: Positive for polyuria  Negative for cold intolerance and heat intolerance  Genitourinary: Negative for difficulty urinating and frequency  Musculoskeletal: Positive for myalgias  Negative for arthralgias, gait problem and joint swelling  Skin: Negative for rash  Neurological: Negative for dizziness, seizures, syncope, weakness and headaches  Hematological: Does not bruise/bleed easily  Psychiatric/Behavioral: Negative for dysphoric mood  All other systems reviewed and are negative  History reviewed  No pertinent past medical history  Past Surgical History:   Procedure Laterality Date   • SPINE SURGERY      herniated discs       History reviewed  No pertinent family history      Social History     Occupational History   • Not on file   Tobacco Use   • Smoking status: Never   • Smokeless tobacco: Never   Vaping Use   • Vaping Use: Never used Substance and Sexual Activity   • Alcohol use: Never   • Drug use: Never   • Sexual activity: Not on file         Current Outpatient Medications:   •  cyclobenzaprine (FLEXERIL) 5 mg tablet, Take 1 tablet (5 mg total) by mouth daily at bedtime, Disp: 10 tablet, Rfl: 0  •  gabapentin (Neurontin) 300 mg capsule, Take 1 capsule (300 mg total) by mouth 3 (three) times a day You are currently taking 100 mg 3 times a day  You will substitute one 300 mg pill for one 100 mg pill every other day until you are taking 300mg three times a day , Disp: 90 capsule, Rfl: 1  •  predniSONE 10 mg tablet, Take 4 pills x4 days, then 3 pills x4 days, then 2 pills x4 days, and then 1 pill x4 days, Disp: 40 tablet, Rfl: 0  •  ibuprofen (MOTRIN) 800 mg tablet, Take 1 tablet (800 mg total) by mouth 3 (three) times a day (Patient not taking: Reported on 10/25/2022), Disp: 21 tablet, Rfl: 0    No Known Allergies    Physical Exam:    /84 (BP Location: Right arm, Patient Position: Sitting, Cuff Size: Standard)   Pulse 65   Ht 5' 6" (1 676 m)   Wt 89 2 kg (196 lb 9 6 oz)   BMI 31 73 kg/m²     Constitutional: normal, well developed, well nourished, alert, in no distress and non-toxic and no overt pain behavior  Eyes: anicteric  HEENT: grossly intact  Neck: supple, symmetric, trachea midline and no masses   Pulmonary:even and unlabored  Cardiovascular:No edema or pitting edema present  Skin:Normal without rashes or lesions and well hydrated  Psychiatric:Mood and affect appropriate  Neurologic:Cranial Nerves II-XII grossly intact  Musculoskeletal:normal     Lumbar Spine Exam    Appearance:  Normal lordosis  Palpation/Tenderness:  no tenderness or spasm  Sensory:  Diminished Sensation to light touch in the bilateral feet  Motor Strength:  Antigravity in all myotomes of the bilateral lower extremities    Reflexes:  Left Patellar:  2+   Right Patellar:  2+   Left Achilles:  2+   Right Achilles:  2+     Imaging       12/29/2022  L-spine -- Magnetic Resonance   MRSPINE -- --     Impression    Impression:     1  Operative changes related to interval posterior fusion at L3-L4  2  Spondylosis contributing to varying degrees of spinal canal and foraminal   stenosis as described above including high-grade spinal canal stenosis at L2-L3   and L4-L5 and moderate bilateral foraminal stenosis at L3-L4  3  Stable grade 1 anterolisthesis of L4 over L5 which is presumably related to   facet arthropathy  Workstation:[id number]  Narrative    History: Low back pain, unspecified back pain laterality, unspecified   chronicity, unspecified whether sciatica present     Procedure: MRI of the lumbar spine was obtained with the following sequences:   Sagittal T1, sagittal T2, sagittal STIR and axial T2 weighted images  No   intravenous contrast      Comparison: Lumbar spine radiographs dated 8/11/2021     Findings: For the purposes of this dictation, the lumbar vertebrae are labeled   from a caudal to cranial direction, the first vertebra with lumbar morphology is   labeled as L5  Conus and lower thoracic cord: The conus terminates at the T12 level and is   unremarkable  The distal thoracic cord is normal in caliber and signal      Marrow and Alignment: Postsurgical changes are noted related to interval   laminectomy and posterior fusion at L3-L4 with paired rods and paired screws in   place  Artifact from hardware limits evaluation of adjacent structures  There is   exaggeration of the lumbar lordosis with mild left convex curvature of the   lumbar spine  Stable 6 mm anterolisthesis of L4 over L5  The vertebral bodies   are otherwise normal in height and alignment  No focal suspicious marrow signal   abnormality  Disc desiccation from L1-L2 through L4-L5 with mild disc space narrowing at   L1-L2 and L4-L5  Multilevel anterior endplate osteophyte formation  L1-L2: Minimal bulging annulus and osteophytic ridging  Ligamentum flavum   infolding  Mild spinal canal stenosis  Mild bilateral foraminal stenosis  L2-L3 : Broad disc bulge and osteophytic ridging with a central annular fissure  Ligamentum flavum infolding  Moderate to severe spinal canal stenosis  Mild bilateral foraminal stenosis  L3-L4: Laminectomy  Broad disc bulge and osteophytic ridging with a right   foraminal annular fissure  Mild to moderate spinal canal stenosis  Moderate   bilateral foraminal stenosis  L4-L5: Listhesis with uncovering of the disc  Mild disc bulge and osteophytic   ridging  Severe bilateral facet hypertrophy and ligamentum flavum infolding  Severe spinal canal stenosis  Mild bilateral foraminal stenosis  L5-S1: Mild disc bulge and osteophytic ridging  Bilateral facet hypertrophy  No   spinal canal stenosis  Mild right foraminal stenosis  No left foraminal   stenosis  Confluent STIR hyperintense signal in the bilateral lower lumbar and upper   sacral posterior paraspinal musculature is most likely posttreatment related  2 2 cm simple appearing fluid collection in the right aspect the laminectomy bed   at L3-L4 which is most likely benign  Procedure Note    Walter Ovalles MD - 12/30/2022   Formatting of this note might be different from the original    History: Low back pain, unspecified back pain laterality, unspecified   chronicity, unspecified whether sciatica present     Procedure: MRI of the lumbar spine was obtained with the following sequences:   Sagittal T1, sagittal T2, sagittal STIR and axial T2 weighted images  No   intravenous contrast      Comparison: Lumbar spine radiographs dated 8/11/2021     Findings: For the purposes of this dictation, the lumbar vertebrae are labeled   from a caudal to cranial direction, the first vertebra with lumbar morphology is   labeled as L5  Conus and lower thoracic cord: The conus terminates at the T12 level and is   unremarkable   The distal thoracic cord is normal in caliber and signal  Marrow and Alignment: Postsurgical changes are noted related to interval   laminectomy and posterior fusion at L3-L4 with paired rods and paired screws in   place  Artifact from hardware limits evaluation of adjacent structures  There is   exaggeration of the lumbar lordosis with mild left convex curvature of the   lumbar spine  Stable 6 mm anterolisthesis of L4 over L5  The vertebral bodies   are otherwise normal in height and alignment  No focal suspicious marrow signal   abnormality  Disc desiccation from L1-L2 through L4-L5 with mild disc space narrowing at   L1-L2 and L4-L5  Multilevel anterior endplate osteophyte formation  L1-L2: Minimal bulging annulus and osteophytic ridging  Ligamentum flavum   infolding  Mild spinal canal stenosis  Mild bilateral foraminal stenosis  L2-L3 : Broad disc bulge and osteophytic ridging with a central annular fissure  Ligamentum flavum infolding  Moderate to severe spinal canal stenosis  Mild   bilateral foraminal stenosis  L3-L4: Laminectomy  Broad disc bulge and osteophytic ridging with a right   foraminal annular fissure  Mild to moderate spinal canal stenosis  Moderate   bilateral foraminal stenosis  L4-L5: Listhesis with uncovering of the disc  Mild disc bulge and osteophytic   ridging  Severe bilateral facet hypertrophy and ligamentum flavum infolding  Severe spinal canal stenosis  Mild bilateral foraminal stenosis  L5-S1: Mild disc bulge and osteophytic ridging  Bilateral facet hypertrophy  No   spinal canal stenosis  Mild right foraminal stenosis  No left foraminal   stenosis  Confluent STIR hyperintense signal in the bilateral lower lumbar and upper   sacral posterior paraspinal musculature is most likely posttreatment related  2 2 cm simple appearing fluid collection in the right aspect the laminectomy bed   at L3-L4 which is most likely benign  IMPRESSION:   Impression:     1   Operative changes related to interval posterior fusion at L3-L4  2  Spondylosis contributing to varying degrees of spinal canal and foraminal   stenosis as described above including high-grade spinal canal stenosis at L2-L3   and L4-L5 and moderate bilateral foraminal stenosis at L3-L4  3  Stable grade 1 anterolisthesis of L4 over L5 which is presumably related to   facet arthropathy  LUMBAR SPINE   11/16/2021     INDICATION:   low back pain      COMPARISON:  None     VIEWS:  XR SPINE LUMBAR 2 OR 3 VIEWS INJURY        FINDINGS:     There are 5 non rib bearing lumbar vertebral bodies       There is no evidence of acute fracture or destructive osseous lesion      L4 on L5 and L5 on S1 mild anterolisthesis appears secondary to facet arthropathy at each level       Age-appropriate lumbar degenerative changes are seen      The pedicles appear intact      Soft tissues are unremarkable      IMPRESSION:  Age-appropriate degenerative change with mild listheses as above    No acute findings        X-ray lumbar spine 2 or 3 views    (Results Pending)   FL spine and pain procedure    (Results Pending)       Orders Placed This Encounter   Procedures   • X-ray lumbar spine 2 or 3 views   • FL spine and pain procedure   • Ambulatory Referral to Neurology

## 2023-03-01 ENCOUNTER — CONSULT (OUTPATIENT)
Dept: PAIN MEDICINE | Facility: CLINIC | Age: 68
End: 2023-03-01

## 2023-03-01 ENCOUNTER — HOSPITAL ENCOUNTER (OUTPATIENT)
Dept: RADIOLOGY | Facility: HOSPITAL | Age: 68
Discharge: HOME | End: 2023-03-01

## 2023-03-01 VITALS
WEIGHT: 196.6 LBS | BODY MASS INDEX: 31.6 KG/M2 | DIASTOLIC BLOOD PRESSURE: 84 MMHG | HEIGHT: 66 IN | HEART RATE: 65 BPM | SYSTOLIC BLOOD PRESSURE: 136 MMHG

## 2023-03-01 DIAGNOSIS — R20.2 PARESTHESIA OF BOTH FEET: ICD-10-CM

## 2023-03-01 DIAGNOSIS — Z98.1 HISTORY OF FUSION OF LUMBAR SPINE: ICD-10-CM

## 2023-03-01 DIAGNOSIS — G89.29 CHRONIC BILATERAL LOW BACK PAIN WITH BILATERAL SCIATICA: ICD-10-CM

## 2023-03-01 DIAGNOSIS — M54.42 CHRONIC BILATERAL LOW BACK PAIN WITH BILATERAL SCIATICA: ICD-10-CM

## 2023-03-01 DIAGNOSIS — M54.41 CHRONIC BILATERAL LOW BACK PAIN WITH BILATERAL SCIATICA: ICD-10-CM

## 2023-03-01 DIAGNOSIS — M48.062 SPINAL STENOSIS OF LUMBAR REGION WITH NEUROGENIC CLAUDICATION: ICD-10-CM

## 2023-03-01 DIAGNOSIS — M96.1 LUMBAR POST-LAMINECTOMY SYNDROME: Primary | ICD-10-CM

## 2023-03-01 DIAGNOSIS — G62.9 DEMYELINATING NEUROPATHY: ICD-10-CM

## 2023-03-01 RX ORDER — GABAPENTIN 300 MG/1
300 CAPSULE ORAL 3 TIMES DAILY
Qty: 90 CAPSULE | Refills: 1 | Status: SHIPPED | OUTPATIENT
Start: 2023-03-01

## 2023-03-01 NOTE — LETTER
March 1, 2023     Rashid Tao MD  [address] [place] [address]    Patient: Laron Lamb   YOB: 1955   Date of Visit: 3/1/2023       Dear Dr Keri Sheridan: Thank you for referring Laron Lamb to me for evaluation  Below are my notes for this consultation  If you have questions, please do not hesitate to call me  I look forward to following your patient along with you  Sincerely,        Keira Pratt MD        CC: No Recipients  Keira Pratt MD  3/1/2023 12:57 PM  Addendum  Assessment:  1  Lumbar post-laminectomy syndrome    2  Paresthesia of both feet    3  Spinal stenosis of lumbar region with neurogenic claudication    4  History of fusion of lumbar spine    5  Chronic bilateral low back pain with bilateral sciatica    6  Demyelinating neuropathy        Plan:  Orders Placed This Encounter   Procedures   • X-ray lumbar spine 2 or 3 views     History of lumbar spine surgeon  MRI describes laminectomy at L3, however patient has a report that discusses surgery at L4-5  Please comment on whether there is laminectomy defect at L4     Standing Status:   Future     Standing Expiration Date:   3/1/2027     Scheduling Instructions:      Bring along any outside films relating to this procedure  • FL spine and pain procedure     Standing Status:   Future     Standing Expiration Date:   3/1/2027     Order Specific Question:   Reason for Exam:     Answer:   L5-S1 LESI     Order Specific Question:   Anticoagulant hold needed?      Answer:   No   • Ambulatory Referral to Neurology     Standing Status:   Future     Standing Expiration Date:   3/1/2024     Referral Priority:   Routine     Referral Type:   Consult - AMB     Referral Reason:   Specialty Services Required     Requested Specialty:   Neurology     Number of Visits Requested:   1     Expiration Date:   3/1/2024       New Medications Ordered This Visit   Medications   • gabapentin (Neurontin) 300 mg capsule     Sig: Take 1 capsule (300 mg total) by mouth 3 (three) times a day You are currently taking 100 mg 3 times a day  You will substitute one 300 mg pill for one 100 mg pill every other day until you are taking 300mg three times a day  Dispense:  90 capsule     Refill:  1       My impressions and treatment recommendations were discussed in detail with the patient, who verbalized understanding and had no further questions  This is a 70-year-old Norwegian-speaking male who presents her office with chief complaint of pain in the bilateral feet with numbness and paresthesias  He has documented demyelinating polyneuropathy based on recent EMG with diminished sensation to light touch in lower extremities  Currently taking gabapentin 100 mg 3 times daily  I will increase him to 300 mg 3 times daily and also refer him to see neurology  He also has an issue of bilateral gluteal pain and leg weakness which is likely secondary to lumbar spinal stenosis  He has notable history of L3-4 fusion based MRI report  The surgery was done in [facility] and his operative report from the surgery that he brought with him mentions L4-5 surgery  We will order updated x-ray lumbar spine to help determine exact location of laminectomy sites  He has advanced spinal stenosis at the L2-3 and L4-5 levels  We discussed L5-S1 lumbar epidural steroid junction to help with his claudication type symptoms  I did mention to him that this will not help with his neuropathic pain  66810 billing necessary due to increased time spent with patient as there was requirement of Salvadorean speaking     [employer] Monitoring Program report was reviewed and was appropriate     Complete risks and benefits including bleeding, infection, tissue reaction, nerve injury and allergic reaction were discussed  The approach was demonstrated using models and literature was provided   Verbal and written consent was obtained  Discharge instructions were provided  I personally saw and examined the patient and I agree with the above discussed plan of care  History of Present Illness:    Jesus Anderson is a 75 y o  male who presents to [facility] for initial evaluation of the above stated pain complaints  The patient has a past medical and chronic pain history as outlined in the assessment section  He was referred by Dr Kraig Morales    He reports pain in the gluteal muscles with walking and prolonged sitting  He reports notable pain in the bottom of the feet as well with numbness and tingling In both feet, especially on the right  Reports surgery helped with sciatica and burning in the legs  He is in physical therapy which has not helped with these issues  He reports that sometimes his legs feel heavy when he walks  Doesn't necessarily improve with sitting  He also has balance issues with walking which is because he cannot feel the ground when he walks  He had L3-4 fusion based on MRI results  This procedure was done in 12/2022 in [facility] and patient has paper saying the procedure was done at L4-5 which contradicts what he is saying  Review of Systems:    Review of Systems   Constitutional: Negative for fever and unexpected weight change  HENT: Negative for trouble swallowing  Eyes: Negative for visual disturbance  Respiratory: Negative for shortness of breath and wheezing  Cardiovascular: Positive for leg swelling  Negative for chest pain and palpitations  Gastrointestinal: Negative for constipation, diarrhea, nausea and vomiting  Endocrine: Positive for polyuria  Negative for cold intolerance and heat intolerance  Genitourinary: Negative for difficulty urinating and frequency  Musculoskeletal: Positive for myalgias  Negative for arthralgias, gait problem and joint swelling  Skin: Negative for rash     Neurological: Negative for dizziness, seizures, syncope, weakness and headaches  Hematological: Does not bruise/bleed easily  Psychiatric/Behavioral: Negative for dysphoric mood  All other systems reviewed and are negative  History reviewed  No pertinent past medical history  Past Surgical History:   Procedure Laterality Date   • SPINE SURGERY      herniated discs       History reviewed  No pertinent family history  Social History     Occupational History   • Not on file   Tobacco Use   • Smoking status: Never   • Smokeless tobacco: Never   Vaping Use   • Vaping Use: Never used   Substance and Sexual Activity   • Alcohol use: Never   • Drug use: Never   • Sexual activity: Not on file         Current Outpatient Medications:   •  cyclobenzaprine (FLEXERIL) 5 mg tablet, Take 1 tablet (5 mg total) by mouth daily at bedtime, Disp: 10 tablet, Rfl: 0  •  gabapentin (Neurontin) 300 mg capsule, Take 1 capsule (300 mg total) by mouth 3 (three) times a day You are currently taking 100 mg 3 times a day  You will substitute one 300 mg pill for one 100 mg pill every other day until you are taking 300mg three times a day , Disp: 90 capsule, Rfl: 1  •  predniSONE 10 mg tablet, Take 4 pills x4 days, then 3 pills x4 days, then 2 pills x4 days, and then 1 pill x4 days, Disp: 40 tablet, Rfl: 0  •  ibuprofen (MOTRIN) 800 mg tablet, Take 1 tablet (800 mg total) by mouth 3 (three) times a day (Patient not taking: Reported on 10/25/2022), Disp: 21 tablet, Rfl: 0    No Known Allergies    Physical Exam:    /84 (BP Location: Right arm, Patient Position: Sitting, Cuff Size: Standard)   Pulse 65   Ht 5' 6" (1 676 m)   Wt 89 2 kg (196 lb 9 6 oz)   BMI 31 73 kg/m²     Constitutional: normal, well developed, well nourished, alert, in no distress and non-toxic and no overt pain behavior    Eyes: anicteric  HEENT: grossly intact  Neck: supple, symmetric, trachea midline and no masses   Pulmonary:even and unlabored  Cardiovascular:No edema or pitting edema present  Skin:Normal without rashes or lesions and well hydrated  Psychiatric:Mood and affect appropriate  Neurologic:Cranial Nerves II-XII grossly intact  Musculoskeletal:normal     Lumbar Spine Exam    Appearance:  Normal lordosis  Palpation/Tenderness:  no tenderness or spasm  Sensory:  Diminished Sensation to light touch in the bilateral feet  Motor Strength:  Antigravity in all myotomes of the bilateral lower extremities  Reflexes:  Left Patellar:  2+   Right Patellar:  2+   Left Achilles:  2+   Right Achilles:  2+     Imaging       12/29/2022  L-spine -- Magnetic Resonance   MRSPINE -- --     Impression    Impression:     1  Operative changes related to interval posterior fusion at L3-L4  2  Spondylosis contributing to varying degrees of spinal canal and foraminal   stenosis as described above including high-grade spinal canal stenosis at L2-L3   and L4-L5 and moderate bilateral foraminal stenosis at L3-L4  3  Stable grade 1 anterolisthesis of L4 over L5 which is presumably related to   facet arthropathy  Workstation:[id number]  Narrative    History: Low back pain, unspecified back pain laterality, unspecified   chronicity, unspecified whether sciatica present     Procedure: MRI of the lumbar spine was obtained with the following sequences:   Sagittal T1, sagittal T2, sagittal STIR and axial T2 weighted images  No   intravenous contrast      Comparison: Lumbar spine radiographs dated 8/11/2021     Findings: For the purposes of this dictation, the lumbar vertebrae are labeled   from a caudal to cranial direction, the first vertebra with lumbar morphology is   labeled as L5  Conus and lower thoracic cord: The conus terminates at the T12 level and is   unremarkable  The distal thoracic cord is normal in caliber and signal      Marrow and Alignment: Postsurgical changes are noted related to interval   laminectomy and posterior fusion at L3-L4 with paired rods and paired screws in   place  Artifact from hardware limits evaluation of adjacent structures  There is   exaggeration of the lumbar lordosis with mild left convex curvature of the   lumbar spine  Stable 6 mm anterolisthesis of L4 over L5  The vertebral bodies   are otherwise normal in height and alignment  No focal suspicious marrow signal   abnormality  Disc desiccation from L1-L2 through L4-L5 with mild disc space narrowing at   L1-L2 and L4-L5  Multilevel anterior endplate osteophyte formation  L1-L2: Minimal bulging annulus and osteophytic ridging  Ligamentum flavum   infolding  Mild spinal canal stenosis  Mild bilateral foraminal stenosis  L2-L3 : Broad disc bulge and osteophytic ridging with a central annular fissure  Ligamentum flavum infolding  Moderate to severe spinal canal stenosis  Mild bilateral foraminal stenosis  L3-L4: Laminectomy  Broad disc bulge and osteophytic ridging with a right   foraminal annular fissure  Mild to moderate spinal canal stenosis  Moderate   bilateral foraminal stenosis  L4-L5: Listhesis with uncovering of the disc  Mild disc bulge and osteophytic   ridging  Severe bilateral facet hypertrophy and ligamentum flavum infolding  Severe spinal canal stenosis  Mild bilateral foraminal stenosis  L5-S1: Mild disc bulge and osteophytic ridging  Bilateral facet hypertrophy  No   spinal canal stenosis  Mild right foraminal stenosis  No left foraminal   stenosis  Confluent STIR hyperintense signal in the bilateral lower lumbar and upper   sacral posterior paraspinal musculature is most likely posttreatment related  2 2 cm simple appearing fluid collection in the right aspect the laminectomy bed   at L3-L4 which is most likely benign    Procedure Note    Uma Smith MD - 12/30/2022   Formatting of this note might be different from the original    History: Low back pain, unspecified back pain laterality, unspecified   chronicity, unspecified whether sciatica present     Procedure: MRI of the lumbar spine was obtained with the following sequences:   Sagittal T1, sagittal T2, sagittal STIR and axial T2 weighted images  No   intravenous contrast      Comparison: Lumbar spine radiographs dated 8/11/2021     Findings: For the purposes of this dictation, the lumbar vertebrae are labeled   from a caudal to cranial direction, the first vertebra with lumbar morphology is   labeled as L5  Conus and lower thoracic cord: The conus terminates at the T12 level and is   unremarkable  The distal thoracic cord is normal in caliber and signal      Marrow and Alignment: Postsurgical changes are noted related to interval   laminectomy and posterior fusion at L3-L4 with paired rods and paired screws in   place  Artifact from hardware limits evaluation of adjacent structures  There is   exaggeration of the lumbar lordosis with mild left convex curvature of the   lumbar spine  Stable 6 mm anterolisthesis of L4 over L5  The vertebral bodies   are otherwise normal in height and alignment  No focal suspicious marrow signal   abnormality  Disc desiccation from L1-L2 through L4-L5 with mild disc space narrowing at   L1-L2 and L4-L5  Multilevel anterior endplate osteophyte formation  L1-L2: Minimal bulging annulus and osteophytic ridging  Ligamentum flavum   infolding  Mild spinal canal stenosis  Mild bilateral foraminal stenosis  L2-L3 : Broad disc bulge and osteophytic ridging with a central annular fissure  Ligamentum flavum infolding  Moderate to severe spinal canal stenosis  Mild   bilateral foraminal stenosis  L3-L4: Laminectomy  Broad disc bulge and osteophytic ridging with a right   foraminal annular fissure  Mild to moderate spinal canal stenosis  Moderate   bilateral foraminal stenosis  L4-L5: Listhesis with uncovering of the disc  Mild disc bulge and osteophytic   ridging  Severe bilateral facet hypertrophy and ligamentum flavum infolding  Severe spinal canal stenosis   Mild bilateral foraminal stenosis  L5-S1: Mild disc bulge and osteophytic ridging  Bilateral facet hypertrophy  No   spinal canal stenosis  Mild right foraminal stenosis  No left foraminal   stenosis  Confluent STIR hyperintense signal in the bilateral lower lumbar and upper   sacral posterior paraspinal musculature is most likely posttreatment related  2 2 cm simple appearing fluid collection in the right aspect the laminectomy bed   at L3-L4 which is most likely benign  IMPRESSION:   Impression:     1  Operative changes related to interval posterior fusion at L3-L4  2  Spondylosis contributing to varying degrees of spinal canal and foraminal   stenosis as described above including high-grade spinal canal stenosis at L2-L3   and L4-L5 and moderate bilateral foraminal stenosis at L3-L4  3  Stable grade 1 anterolisthesis of L4 over L5 which is presumably related to   facet arthropathy  LUMBAR SPINE   11/16/2021     INDICATION:   low back pain      COMPARISON:  None     VIEWS:  XR SPINE LUMBAR 2 OR 3 VIEWS INJURY        FINDINGS:     There are 5 non rib bearing lumbar vertebral bodies       There is no evidence of acute fracture or destructive osseous lesion      L4 on L5 and L5 on S1 mild anterolisthesis appears secondary to facet arthropathy at each level       Age-appropriate lumbar degenerative changes are seen      The pedicles appear intact      Soft tissues are unremarkable      IMPRESSION:  Age-appropriate degenerative change with mild listheses as above    No acute findings        X-ray lumbar spine 2 or 3 views    (Results Pending)   FL spine and pain procedure    (Results Pending)       Orders Placed This Encounter   Procedures   • X-ray lumbar spine 2 or 3 views   • FL spine and pain procedure   • Ambulatory Referral to Neurology

## 2023-03-01 NOTE — PATIENT INSTRUCTIONS
Inyección epidural de esteroides   CUIDADO AMBULATORIO:   Lo que necesita saber acerca de claus inyección epidural de esteroides (IEE): Claus inyección epidural de esteroides (IEE) es un procedimiento para inyectar un medicamento esteroideo en el espacio epidural  El espacio epidural se encuentra entre aviles médula reyes y las Elias  Los esteroides reducen la inflamación y la acumulación de líquido en aviles columna que podrían estar provocando el dolor  Es posible que le administren un medicamento para el dolor junto con esteroides  La forma para prepararse para claus inyección epidural de esteroides: Aviles médico hablará con usted acerca de cómo prepararse para el procedimiento  Le dirá qué medicamentos puede marine o no el día del procedimiento  Podría ser necesario que deje de marine anticoagulantes u otros medicamentos unos días antes de aviles procedimiento  Es posible que usted necesite ajustar cualquier medicamento para la diabetes que esté tomando el día de aviles procedimiento  Los medicamentos de esteroides pueden aumentar los niveles de azúcar en aviles araseli  Lesli los arreglos necesarios para que claus persona lo lleve a aviles casa cuando le den el jeff  Qué sucederá jamir claus inyección epidural de esteroides:  A usted le administrarán un medicamento para adormecer el área del procedimiento  Usted estará despierto jamir el procedimiento, shahrzad no sentirá dolor  Puede que también se le administre medicamento para ayudarlo a relajarse  Se usará un medio de contraste para ayudar a que aviles médico arnie el área con mayor claridad  Informe al médico si usted alguna vez ha tenido claus reacción alérgica al líquido de Carlos  Aviles médico podría colocar la aguja en el área de aviles moose, en la parte media de aviles espalda o en el área del cóccix  Podría inyectarle el medicamento junto a los nervios que están provocándole el dolor   En cambio, podría inyectarle el medicamento en un área más nohelia del espacio epidural  [place] ayuda a que el medicamento se desplace a más nervios  Reed médico usará un fluoroscopio que sirve para guiar la aguja hacia el lugar correcto  Un fluoroscopio es un tipo de radiografía  Después del procedimiento, se colocará un vendaje sobre el área de la inyección para evitar claus infección  Lo que sucederá después de claus inyección epidural de esteroides: Usted tendrá un vendaje sobre el sitio de la inyección para prevenir claus infección  Reed médico le indicará cuándo puede bañarse y le dará pautas para realizar actividades  Usted podrá regresar a reed hogar  Los riesgos de claus inyección epidural de esteroides: Es posible que usted sufra daño a los nervios o parálisis temporal o Raul  Usted podría presentar claus hemorragia o desarrollar claus infección seria, catie meningitis (inflamación del revestimiento del cerebro)  También podría desarrollar un absceso  Un absceso es un área llena de pus bajo reed piel  Usted podría necesitar claus cirugía para reparar el absceso  Es posible que usted tenga claus convulsión, ansiedad o dificultad para dormir  Si usted es un hombre podría tener disfunción eréctil temporal (ser Almarie Alex de Markus Leyva erección)  Llame al [place] de emergencias local (911 en los [place]) si:  Usted sufre claus convulsión  Usted tiene dificultad para  las piernas  Busque atención médica de inmediato si:  La araseli empapa el vendaje  Usted tiene fiebre o escalofríos, dolor de espalda severo y el área del procedimiento está sensible al tacto  Usted no puede controlar cuándo [place] o tiene claus evacuación intestinal     Llame a reed médico si:  Usted tiene debilidad o entumecimiento en kristen piernas  Reed herida está aroldo, inflamada o drena pus  Tiene náuseas o está vomitando  Reed sintia o moose están rojos y usted siente calor  Usted tiene [employer] del que tenía antes del procedimiento  Usted tiene inflamación en kristen sandie y pies      Usted tiene preguntas o inquietudes acerca de reed condición o cuidado  Siga las instrucciones de reed médico sobre el cuidado de kristen heridas: Usted puede quitarse el vendaje antes de acostarse el día de reed procedimiento  Usted puede marine claus ducha, shahrzad no tome un baño en mary por lo menos en 24 horas  Cuidados personales:  No maneje un vehículo, ni use máquinas ni realice actividades agotadoras por 24 horas después de reed procedimiento o catie se le indique  Continúe otros tratamientos según las indicaciones  Las inyecciones de esteroides no controlarán reed dolor por sí solas  Las inyecciones tienen el propósito de usarse junto con otros tratamientos, catie la fisioterapia  Acuda a la consulta de control con reed médico según las indicaciones: Anote kristen preguntas para que se acuerde de hacerlas jamir kristen visitas  © Copyright Glee Bird 2022 Information is for End User's use only and may not be sold, redistributed or otherwise used for commercial purposes  Esta información es sólo para uso en educación  Reed intención no es darle un consejo médico sobre enfermedades o tratamientos  Colsulte con reed Clinton Larve farmacéutico antes de seguir cualquier régimen médico para saber si es seguro y efectivo para usted

## 2023-03-03 ENCOUNTER — TELEPHONE (OUTPATIENT)
Dept: PAIN MEDICINE | Facility: MEDICAL CENTER | Age: 68
End: 2023-03-03

## 2023-03-07 NOTE — TELEPHONE ENCOUNTER
.Caller: pt    Doctor: Genie    Reason for call: Please reach out for scheduling, thank you      Call back#: [phone]      
Aware thanks.   
Caller: patient sister    Doctor: berhane    Reason for call: would like a call back regarding more info of the injection    Call back#: [phone]    
Caller:Armando Son     Doctor: Dr Prescott     Reason for call: Patient calling to schedule procedure patient is Syriac speaking     Call back#: [phone] or [phone]  
Please schedule  
S/W pt scheduled for 3/9/23 @ 2:40 pm  
S/W pt, he is refusing injection at this point.  He will call back if he changes his mind.  
S/w sister who states pt is now interested in injection.  Please call pt or sister to schedule  
No

## 2023-03-08 DIAGNOSIS — M54.9 BACK PAIN: ICD-10-CM

## 2023-03-08 RX ORDER — IBUPROFEN 800 MG/1
800 TABLET ORAL 3 TIMES DAILY PRN
Qty: 60 TABLET | Refills: 0 | Status: SHIPPED | OUTPATIENT
Start: 2023-03-08 | End: 2023-07-19

## 2023-03-09 ENCOUNTER — HOSPITAL ENCOUNTER (OUTPATIENT)
Dept: RADIOLOGY | Facility: CLINIC | Age: 68
Discharge: HOME | End: 2023-03-09

## 2023-03-09 VITALS
HEART RATE: 61 BPM | DIASTOLIC BLOOD PRESSURE: 82 MMHG | TEMPERATURE: 97.2 F | SYSTOLIC BLOOD PRESSURE: 144 MMHG | RESPIRATION RATE: 20 BRPM | OXYGEN SATURATION: 98 %

## 2023-03-09 DIAGNOSIS — M48.062 SPINAL STENOSIS OF LUMBAR REGION WITH NEUROGENIC CLAUDICATION: ICD-10-CM

## 2023-03-09 RX ORDER — METHYLPREDNISOLONE ACETATE 80 MG/ML
80 INJECTION, SUSPENSION INTRA-ARTICULAR; INTRALESIONAL; INTRAMUSCULAR; PARENTERAL; SOFT TISSUE ONCE
Status: COMPLETED | OUTPATIENT
Start: 2023-03-09 | End: 2023-03-09

## 2023-03-09 RX ADMIN — METHYLPREDNISOLONE ACETATE 80 MG: 80 INJECTION, SUSPENSION INTRA-ARTICULAR; INTRALESIONAL; INTRAMUSCULAR; PARENTERAL; SOFT TISSUE at 14:35

## 2023-03-09 RX ADMIN — IOHEXOL 1 ML: 300 INJECTION, SOLUTION INTRAVENOUS at 14:35

## 2023-03-09 NOTE — DISCHARGE INSTR - LAB
INSTRUCCIONES PARA EL [place] INYECCIÓN EPIDURAL DE ESTEROIDES    ACTIVIDAD   No conduzca ni opere maquinarias por hoy  No realice actividades extenuantes por hoy, catie agacharse, levantar objetos, etc    Puede retomar kristen actividades normales desde mañana  Comience progresivamente y en la medida que lo tolere  Puede ducharse, shahrzad no se bañe en tinas ni en jacuzzis por hoy  Puede sentir entumecimiento jamir varias horas debido a la anestesia local  Sea precavido y use el sentido común, en especial con las actividades que implican la carga de [place]  CUIDADO DEL ÁREA DE APLICACIÓN DE LA INYECCIÓN   Si siente molestias o dolor, aplique hielo en el área por hoy (colóquelo jamir 20 minutos y retírelo jamir otros 20 minutos)  A partir de mañana, podrá aplicar calor húmedo o hielo, si lo necesita  Puede experimentar un aumento o cambio en el malestar jamir las 36-48 horas posteriores al tratamiento  Aplique hielo y continúe tomando cualquier analgésico que le hayan recetado  Informe a [facility] si se presenta alguno de estos síntomas: enrojecimiento, secreción, inflamación, dolor de mervin, rigidez de moose o fiebre superior a 100 °F  INSTRUCCIONES ESPECIALES  Se pondrán en contacto de nuestro consultorio con usted en aproximadamente 7 días para pedir un informe de progreso  MEDICAMENTOS   Continúe tomando todos kristen medicamentos de [place]  Es posible que en nuestro consultorio le hayan indicado que deje de marine algunos medicamentos  Puede volver a tomarlos el:              Si tiene algún problema relacionado específicamente con el procedimiento, llame a nuestro consultorio al [phone]  Si tiene problemas que no están relacionados con reed procedimiento, debe comunicarse con reed médico de cabecera

## 2023-03-09 NOTE — INTERVAL H&P NOTE
Update: (This section must be completed if the H&P was completed greater than 24 hrs to procedure or admission)    H&P reviewed  After examining the patient, I find no changed to the H&P since it had been written  Patient re-evaluated   Accept as history and physical     Dorian Cummings MD/March 9, 2023/2:16 PM

## 2023-03-16 ENCOUNTER — TELEPHONE (OUTPATIENT)
Dept: PAIN MEDICINE | Facility: CLINIC | Age: 68
End: 2023-03-16

## 2023-03-20 NOTE — TELEPHONE ENCOUNTER
Caller: Armando Saini   Doctor/office: Dr Prescott   CB#: [phone]    % of improvement: 50%  Pain Scale (1-10): 6-7/10    Numbness to rt side fingers

## 2023-05-05 ENCOUNTER — TELEPHONE (OUTPATIENT)
Dept: PAIN MEDICINE | Facility: MEDICAL CENTER | Age: 68
End: 2023-05-05

## 2023-05-05 NOTE — TELEPHONE ENCOUNTER
S/w pt  S/p 3/9 LESI  Pt had reported 50% improvement previously   States relief has now worn off and would like to repeat LESI  Please advise if ok to schedule repeat

## 2023-05-08 NOTE — TELEPHONE ENCOUNTER
S/w pt's sister and scheduled LESI for 6/13/23 915 am arrival  Gave pre procedure instructions and /vaccine policy

## 2023-05-08 NOTE — TELEPHONE ENCOUNTER
Caller: Demian Major sister    Doctor: Dr Friddie Dubin     Reason for call: Schedule a procedure     Call back#: [phone]

## 2023-06-13 ENCOUNTER — HOSPITAL ENCOUNTER (OUTPATIENT)
Dept: RADIOLOGY | Facility: CLINIC | Age: 68
Discharge: HOME | End: 2023-06-13
Admitting: STUDENT IN AN ORGANIZED HEALTH CARE EDUCATION/TRAINING PROGRAM
Payer: COMMERCIAL

## 2023-06-13 VITALS
SYSTOLIC BLOOD PRESSURE: 123 MMHG | OXYGEN SATURATION: 95 % | HEART RATE: 65 BPM | DIASTOLIC BLOOD PRESSURE: 66 MMHG | TEMPERATURE: 96.5 F | RESPIRATION RATE: 20 BRPM

## 2023-06-13 DIAGNOSIS — M48.062 SPINAL STENOSIS OF LUMBAR REGION WITH NEUROGENIC CLAUDICATION: ICD-10-CM

## 2023-06-13 PROCEDURE — 62323 NJX INTERLAMINAR LMBR/SAC: CPT | Performed by: STUDENT IN AN ORGANIZED HEALTH CARE EDUCATION/TRAINING PROGRAM

## 2023-06-13 RX ORDER — METHYLPREDNISOLONE ACETATE 80 MG/ML
80 INJECTION, SUSPENSION INTRA-ARTICULAR; INTRALESIONAL; INTRAMUSCULAR; PARENTERAL; SOFT TISSUE ONCE
Status: COMPLETED | OUTPATIENT
Start: 2023-06-13 | End: 2023-06-13

## 2023-06-13 RX ADMIN — METHYLPREDNISOLONE ACETATE 80 MG: 80 INJECTION, SUSPENSION INTRA-ARTICULAR; INTRALESIONAL; INTRAMUSCULAR; PARENTERAL; SOFT TISSUE at 10:06

## 2023-06-13 RX ADMIN — IOHEXOL 1 ML: 300 INJECTION, SOLUTION INTRAVENOUS at 10:05

## 2023-06-13 NOTE — DISCHARGE INSTR - LAB
INSTRUCCIONES PARA EL ROMAN DE [place] INYECCIÓN EPIDURAL DE ESTEROIDES    ACTIVIDAD   No conduzca ni opere maquinarias por hoy  No realice actividades extenuantes por hoy, catie agacharse, levantar objetos, etc    Puede retomar kristen actividades normales desde mañana  Comience progresivamente y en la medida que lo tolere  Puede ducharse, shahrzad no se bañe en tinas ni en jacuzzis por hoy  Puede sentir entumecimiento jamir varias horas debido a la anestesia local  Sea precavido y use el sentido común, en especial con las actividades que implican la carga de [place]  CUIDADO DEL ÁREA DE APLICACIÓN DE LA INYECCIÓN   Si siente molestias o dolor, aplique hielo en el área por hoy (colóquelo jamir 20 minutos y retírelo jamir otros 20 minutos)  A partir de mañana, podrá aplicar calor húmedo o hielo, si lo necesita  Puede experimentar un aumento o cambio en el malestar jamir las 36-48 horas posteriores al tratamiento  Aplique hielo y continúe tomando cualquier analgésico que le hayan recetado  Informe a [facility] si se presenta alguno de estos síntomas: enrojecimiento, secreción, inflamación, dolor de mervin, rigidez de moose o fiebre superior a 100 °F  INSTRUCCIONES ESPECIALES  Se pondrán en contacto de nuestro consultorio con usted en aproximadamente 7 días para pedir un informe de progreso  MEDICAMENTOS   Continúe tomando todos kristen medicamentos de [place]  Es posible que en nuestro consultorio le hayan indicado que deje de marine algunos medicamentos  Puede volver a tomarlos el:              Si tiene algún problema relacionado específicamente con el procedimiento, llame a nuestro consultorio al [phone]  Si tiene problemas que no están relacionados con reed procedimiento, debe comunicarse con reed médico de cabecera

## 2023-06-13 NOTE — H&P
History of Present Illness: The patient is a 75 y o  male who presents with complaints of back and bilateral leg pain    No past medical history on file  Past Surgical History:   Procedure Laterality Date   • SPINE SURGERY      herniated discs         Current Outpatient Medications:   •  cyclobenzaprine (FLEXERIL) 5 mg tablet, Take 1 tablet (5 mg total) by mouth daily at bedtime, Disp: 10 tablet, Rfl: 0  •  gabapentin (Neurontin) 300 mg capsule, Take 1 capsule (300 mg total) by mouth 3 (three) times a day You are currently taking 100 mg 3 times a day  You will substitute one 300 mg pill for one 100 mg pill every other day until you are taking 300mg three times a day , Disp: 90 capsule, Rfl: 1  •  ibuprofen (MOTRIN) 800 mg tablet, Take 1 tablet (800 mg total) by mouth 3 (three) times a day as needed for moderate pain, Disp: 60 tablet, Rfl: 0  •  predniSONE 10 mg tablet, Take 4 pills x4 days, then 3 pills x4 days, then 2 pills x4 days, and then 1 pill x4 days, Disp: 40 tablet, Rfl: 0    No Known Allergies    Physical Exam:   Vitals:    06/13/23 0930   BP: 123/66   Pulse: 65   Resp: 20   Temp: (!) 96 5 °F (35 8 °C)   SpO2: 95%     General: Awake, Alert, Oriented x 3, Mood and affect appropriate  Respiratory: Respirations even and unlabored  Cardiovascular: Peripheral pulses intact; no edema  Musculoskeletal Exam: back non erytheaous no lesions    ASA Score: 3    Patient/Chart Verification  Patient ID Verified: Verbal  ID Band Applied: No  Consents Confirmed: To be obtained in the Pre-Procedure area  H&P( within 30 days) Verified: To be obtained in the Pre-Procedure area  Interval H&P(within 24 hr) Complete (required for Outpatients and Surgery Admit only): To be obtained in the Pre-Procedure area  Allergies Reviewed: Yes  Anticoag/NSAID held?: NA  Currently on antibiotics?: No  Pregnancy denied?: NA    Assessment:   1   Spinal stenosis of lumbar region with neurogenic claudication        Plan: L5-S1 LESI

## 2023-07-12 ENCOUNTER — APPOINTMENT (EMERGENCY)
Dept: CT IMAGING | Facility: HOSPITAL | Age: 68
End: 2023-07-12
Payer: COMMERCIAL

## 2023-07-12 ENCOUNTER — HOSPITAL ENCOUNTER (EMERGENCY)
Facility: HOSPITAL | Age: 68
Discharge: HOME | End: 2023-07-12
Attending: EMERGENCY MEDICINE
Payer: COMMERCIAL

## 2023-07-12 VITALS
OXYGEN SATURATION: 98 % | RESPIRATION RATE: 18 BRPM | DIASTOLIC BLOOD PRESSURE: 72 MMHG | SYSTOLIC BLOOD PRESSURE: 143 MMHG | TEMPERATURE: 98.3 F | HEART RATE: 54 BPM

## 2023-07-12 DIAGNOSIS — R93.7 ABNORMAL CT SCAN, LUMBAR SPINE: ICD-10-CM

## 2023-07-12 DIAGNOSIS — M54.9 BACK PAIN: Primary | ICD-10-CM

## 2023-07-12 PROCEDURE — 72131 CT LUMBAR SPINE W/O DYE: CPT

## 2023-07-12 RX ORDER — LIDOCAINE 50 MG/G
2 PATCH TOPICAL ONCE
Status: DISCONTINUED | OUTPATIENT
Start: 2023-07-12 | End: 2023-07-12 | Stop reason: HOSPADM

## 2023-07-12 RX ORDER — KETOROLAC TROMETHAMINE 30 MG/ML
15 INJECTION, SOLUTION INTRAMUSCULAR; INTRAVENOUS ONCE
Status: COMPLETED | OUTPATIENT
Start: 2023-07-12 | End: 2023-07-12

## 2023-07-12 RX ORDER — LIDOCAINE 50 MG/G
1 PATCH TOPICAL DAILY
Qty: 6 PATCH | Refills: 0 | Status: SHIPPED | OUTPATIENT
Start: 2023-07-12

## 2023-07-12 RX ORDER — PREDNISONE 20 MG/1
40 TABLET ORAL DAILY
Qty: 8 TABLET | Refills: 0 | Status: SHIPPED | OUTPATIENT
Start: 2023-07-12 | End: 2023-07-16

## 2023-07-12 RX ORDER — KETOROLAC TROMETHAMINE 10 MG/1
10 TABLET, FILM COATED ORAL EVERY 6 HOURS PRN
Qty: 10 TABLET | Refills: 0 | Status: SHIPPED | OUTPATIENT
Start: 2023-07-12 | End: 2023-07-19

## 2023-07-12 RX ADMIN — LIDOCAINE 2 PATCH: 50 PATCH CUTANEOUS at 16:16

## 2023-07-12 RX ADMIN — KETOROLAC TROMETHAMINE 15 MG: 30 INJECTION, SOLUTION INTRAMUSCULAR at 16:16

## 2023-07-12 RX ADMIN — PREDNISONE 50 MG: 20 TABLET ORAL at 16:16

## 2023-07-12 NOTE — DISCHARGE INSTRUCTIONS
Follow up with PCP. Recommend follow up with orthopedics/comprehensive spine in 2-3 weeks  Lidocaine patches, once a day, 12 hours on 12 hours off  Toradol for pain every 6 hours as needed  Prednisone x 4 days  Return to the ED with new or worsening symptoms including but not limited to worsening pain, bladder or bowel habit changes, bladder or bowel incontinence, or saddle anesthesia.

## 2023-07-12 NOTE — ED PROVIDER NOTES
History  Chief Complaint   Patient presents with   • Back Pain     Back pain radiating down b/l legs with numbness, hx of back surg 1 yr ago [facility], pain is worse with walking and sitting; pt is Malaysian speaking only      Patient is a 62-year-old male past medical history of low back pain and neuropathy presenting to the emergency department for evaluation of back pain. Patient reports for the past 2 weeks he has had gotten progressively worsening back pain. Patient reports that radiates down through both buttocks and down bilateral legs. Patient reports it is a tingling pain. Patient reports the pain is worse with walking. Patient reports 1 year ago he did have disc herniation surgery in [place] secondary to insurance issues. Patient reports he is able to ambulate. Denies bladder or bowel habit changes, bladder or bowel incontinence, or saddle anesthesia. Denies fevers, chills, rash, headache, weakness, dizziness, visual changes, abdominal pain, nausea, vomiting, diarrhea, constipation, chest pain, shortness of breath or difficulty breathing. Does not offer any other concerns or complaints. Prior to Admission Medications   Prescriptions Last Dose Informant Patient Reported? Taking? cyclobenzaprine (FLEXERIL) 5 mg tablet   No No   Sig: Take 1 tablet (5 mg total) by mouth daily at bedtime   gabapentin (Neurontin) 300 mg capsule   No No   Sig: Take 1 capsule (300 mg total) by mouth 3 (three) times a day You are currently taking 100 mg 3 times a day. You will substitute one 300 mg pill for one 100 mg pill every other day until you are taking 300mg three times a day.    ibuprofen (MOTRIN) 800 mg tablet   No No   Sig: Take 1 tablet (800 mg total) by mouth 3 (three) times a day as needed for moderate pain   predniSONE 10 mg tablet   No No   Sig: Take 4 pills x4 days, then 3 pills x4 days, then 2 pills x4 days, and then 1 pill x4 days      Facility-Administered Medications: None       No past medical history on file. Past Surgical History:   Procedure Laterality Date   • BACK SURGERY     • SPINE SURGERY      herniated discs       No family history on file. I have reviewed and agree with the history as documented. E-Cigarette/Vaping   • E-Cigarette Use Never User      E-Cigarette/Vaping Substances     Social History     Tobacco Use   • Smoking status: Never   • Smokeless tobacco: Never   Vaping Use   • Vaping Use: Never used   Substance Use Topics   • Alcohol use: Never   • Drug use: Never       Review of Systems   Constitutional: Negative for chills and fever. HENT: Negative for ear pain and sore throat. Eyes: Negative for pain and visual disturbance. Respiratory: Negative for cough and shortness of breath. Cardiovascular: Negative for chest pain and palpitations. Gastrointestinal: Negative for abdominal pain, constipation, diarrhea, nausea and vomiting. Genitourinary: Negative for dysuria and hematuria. Musculoskeletal: Positive for back pain. Negative for arthralgias. Skin: Negative for color change and rash. Neurological: Negative for seizures and syncope. All other systems reviewed and are negative. Physical Exam  Physical Exam  Vitals and nursing note reviewed. Constitutional:       General: He is not in acute distress. Appearance: Normal appearance. He is well-developed. He is not toxic-appearing or diaphoretic. HENT:      Head: Normocephalic and atraumatic. Right Ear: External ear normal.      Left Ear: External ear normal.      Nose: Nose normal.      Mouth/Throat:      Mouth: Mucous membranes are moist.   Eyes:      General: No scleral icterus. Right eye: No discharge. Left eye: No discharge. Conjunctiva/sclera: Conjunctivae normal.   Cardiovascular:      Rate and Rhythm: Normal rate and regular rhythm. Heart sounds: No murmur heard. Pulmonary:      Effort: Pulmonary effort is normal. No respiratory distress.       Breath sounds: Normal breath sounds. Abdominal:      Palpations: Abdomen is soft. Tenderness: There is no abdominal tenderness. Musculoskeletal:         General: No swelling, deformity or signs of injury. Normal range of motion. Cervical back: Normal, normal range of motion and neck supple. No rigidity. Thoracic back: Normal.      Lumbar back: Spasms and tenderness present. Back:       Comments: Reports worsening pain in the bilateral buttocks then in the back. Reports it radiates down the bilateral legs. Skin:     General: Skin is warm and dry. Capillary Refill: Capillary refill takes less than 2 seconds. Coloration: Skin is not jaundiced. Findings: No erythema or rash. Neurological:      General: No focal deficit present. Mental Status: He is alert and oriented to person, place, and time. Mental status is at baseline. Cranial Nerves: No cranial nerve deficit. Gait: Gait normal.   Psychiatric:         Mood and Affect: Mood normal.         Behavior: Behavior normal.         Thought Content:  Thought content normal.         Judgment: Judgment normal.         Vital Signs  ED Triage Vitals   Temperature Pulse Respirations Blood Pressure SpO2   07/12/23 1436 07/12/23 1439 07/12/23 1436 07/12/23 1436 07/12/23 1436   98.3 °F (36.8 °C) 57 18 136/66 100 %      Temp Source Heart Rate Source Patient Position - Orthostatic VS BP Location FiO2 (%)   07/12/23 1436 07/12/23 1436 07/12/23 1436 07/12/23 1436 --   Temporal Monitor Sitting Left arm       Pain Score       07/12/23 1646       5           Vitals:    07/12/23 1436 07/12/23 1439 07/12/23 1655   BP: 136/66  143/72   Pulse:  57 (!) 54   Patient Position - Orthostatic VS: Sitting           Visual Acuity      ED Medications  Medications   lidocaine (LIDODERM) 5 % patch 2 patch (2 patches Topical Medication Applied 7/12/23 1616)   predniSONE tablet 50 mg (50 mg Oral Given 7/12/23 1616)   ketorolac (TORADOL) injection 15 mg (15 mg Intramuscular Given 7/12/23 1616)       Diagnostic Studies  Results Reviewed     None                 CT lumbar spine without contrast    (Results Pending)              Procedures  Procedures         ED Course  ED Course as of 07/12/23 1715   Wed Jul 12, 2023   1706 Reports improvement of pain, reports there is only slight discomfort in the right buttock                 Medical Decision Making    This is a 62-year-old male present to the emergency department for evaluation of low back pain. Patient reports he had this pain progressively worsening over the past 2 weeks. Patient reports he did have a disc herniation surgery in [place] last year. Patient reports the pain radiates from his low back through his buttocks down to his bilateral legs. Patient reports it is a sharp pain with intermittent tingling. Patient reports he does have neuropathy of his feet that has not changed. Patient is in no acute distress, stable vital signs initial examination. Patient reports he has not taken anything for the pain at home. Differential diagnosis to include but is not limited to: Strain, sprain, sciatica, disc etiology, compression fracture    Initial ED Plan: CT lumbar spine, Toradol, prednisone, Lidoderm patch    ED results:  Patient reports pain alleviation after medication. Signed out to [employer] PA-C, pending CT results. Amount and/or Complexity of Data Reviewed  Radiology: ordered. Risk  Prescription drug management.           Disposition  Final diagnoses:   Back pain     Time reflects when diagnosis was documented in both MDM as applicable and the Disposition within this note     Time User Action Codes Description Comment    7/12/2023  5:07 PM Florecita Botello Add [M54.9] Back pain       ED Disposition     None      Follow-up Information     Follow up With Specialties Details Why Contact Info Additional Information    your PCP  Call in 3 days For follow up      [facility] Call in 1 week For follow up [phone] Emergency Department Emergency Medicine Go to  If symptoms worsen [address] [facility] Emergency Department, [place], [place], [address]          Patient's Medications   Discharge Prescriptions    KETOROLAC (TORADOL) 10 MG TABLET    Take 1 tablet (10 mg total) by mouth every 6 (six) hours as needed for moderate pain       Start Date: 7/12/2023 End Date: --       Order Dose: 10 mg       Quantity: 10 tablet    Refills: 0    LIDOCAINE (LIDODERM) 5 %    Apply 1 patch topically over 12 hours daily Remove & Discard patch within 12 hours or as directed by MD       Start Date: 7/12/2023 End Date: --       Order Dose: 1 patch       Quantity: 6 patch    Refills: 0    PREDNISONE 20 MG TABLET    Take 2 tablets (40 mg total) by mouth daily for 4 days       Start Date: 7/12/2023 End Date: 7/16/2023       Order Dose: 40 mg       Quantity: 8 tablet    Refills: 0           PDMP Review     None          ED Provider  Electronically Signed by           Jackie Pride PA-C  07/12/23 2420

## 2023-07-12 NOTE — ED NOTES
Ordered [employer] for patient, instructed patient to wait in waiting room for ride.      Elissa Delaney RN  07/12/23 8323

## 2023-07-13 ENCOUNTER — TELEPHONE (OUTPATIENT)
Dept: PHYSICAL THERAPY | Facility: OTHER | Age: 68
End: 2023-07-13

## 2023-07-17 NOTE — PROGRESS NOTES
Pain Medicine Follow-Up Note    Assessment:  1. Spinal stenosis of lumbar region with neurogenic claudication    2. Chronic pain syndrome    3. Demyelinating neuropathy    4. Chronic bilateral low back pain with bilateral sciatica    5. History of lumbar fusion        Plan:  Orders Placed This Encounter   Procedures   • Durable Medical Equipment     1 TENS UNIT   • Ambulatory referral to Orthopedic Surgery     Standing Status:   Future     Standing Expiration Date:   7/19/2024     Referral Priority:   Routine     Referral Type:   Consult - AMB     Referral Reason:   Specialty Services Required     Referred to Provider:   Machelle De MD     Requested Specialty:   Orthopedic Surgery     Number of Visits Requested:   1     Expiration Date:   7/18/2024       New Medications Ordered This Visit   Medications   • acetaminophen (TYLENOL) 325 mg tablet     Sig: Take 650 mg by mouth every 6 (six) hours as needed   • Diclofenac Sodium (VOLTAREN) 1 %   • meloxicam (MOBIC) 15 mg tablet   • capsicum (ZOSTRIX) 0.075 % topical cream     Sig: Apply topically 3 (three) times a day     Dispense:  28.3 g     Refill:  0   • pregabalin (LYRICA) 75 mg capsule     Sig: Take 1 capsule (75 mg total) by mouth 2 (two) times a day Start by taking 1 capsule at bedtime for 2 days. Then increase to 1 capsule in the evening and 1 capsule at bedtime. Dispense:  60 capsule     Refill:  0       My impressions and treatment recommendations were discussed in detail with the patient who verbalized understanding and had no further questions. This is a 68-year-old male who returns to the office with again multifactorial issues. He continues to suffer from lumbar spinal stenosis with neurogenic claudication. However his main concern is his bilateral feet and abnormal sensation which is due to demyelinating polyneuropathy. He stopped gabapentin due to leg swelling and I will start him on Lyrica 75mg BID.   He has visit with neurology in the fall.  We will see him back in 4 weeks. If there is no significant improvement, then we will look to add additional classes of medication after maximizing gabapentin dose. Also will add capsaicin cream to apply topically 3 times a day. wILL Also order TENS unit for neuropathy pain treatment. Hs additional issue is L2-3 and L4-5 lumbar spinal stenosis with neurogenic claudication. He previously had L3-4 laminectomy fusion in [place] about 1 year ago, but appears to continue to be symptomatic from spinal stenosis. I did explain this is not responsible for his foot symptoms. I would like to send him to see DR. Hamlin given lack of response to injection treatments and land based PT. May consider trial of JOEL closer to L2-3 in future but would like for him to establish care with surgery first.    [place] Prescription Drug Monitoring Program report was reviewed and was appropriate     Complete risks and benefits including bleeding, infection, tissue reaction, nerve injury and allergic reaction were discussed. The approach was demonstrated using models and literature was provided. Verbal and written consent was obtained. Discharge instructions were provided. I personally saw and examined the patient and I agree with the above discussed plan of care. History of Present Illness:    Johnathan Rincon is a 75 y.o. male who presents to [facility] for interval re-evaluation of the above stated pain complaints. The patient has a past medical and chronic pain history as outlined in the assessment section. He was last seen on 6/13/2023 for L5-S1 lumbar epidural steroid injection with no significant improvement. Returns today with 8 out of 10 pain. In the back and in the bilateral lower extremities. However his ultimate chief complaint is bilateral foot pain. It is present all day. Intermittent. Sharp, cramping, numbness, pins-and-needles. The right foot is worse than left.   He again has previously documented bilateral sensorimotor demyelinating neuropathy that is worse on the right side. .    Currently taking gabapentin 300 to milligrams 3 times daily without any side effects. Other than as stated above, the patient denies any interval changes in medications, medical condition, mental condition, symptoms, or allergies since the last office visit. Review of Systems:    Review of Systems   Respiratory: Negative for shortness of breath. Cardiovascular: Negative for chest pain. Gastrointestinal: Negative for constipation, diarrhea, nausea and vomiting. Musculoskeletal: Positive for arthralgias, gait problem and myalgias. Negative for joint swelling. DROM with weakness    Skin: Negative for rash. Neurological: Negative for dizziness, seizures and weakness. All other systems reviewed and are negative. History reviewed. No pertinent past medical history. Past Surgical History:   Procedure Laterality Date   • BACK SURGERY     • SPINE SURGERY      herniated discs       History reviewed. No pertinent family history.     Social History     Occupational History   • Not on file   Tobacco Use   • Smoking status: Never   • Smokeless tobacco: Never   Vaping Use   • Vaping Use: Never used   Substance and Sexual Activity   • Alcohol use: Never   • Drug use: Never   • Sexual activity: Not on file         Current Outpatient Medications:   •  acetaminophen (TYLENOL) 325 mg tablet, Take 650 mg by mouth every 6 (six) hours as needed, Disp: , Rfl:   •  cyclobenzaprine (FLEXERIL) 5 mg tablet, Take 1 tablet (5 mg total) by mouth daily at bedtime, Disp: 10 tablet, Rfl: 0  •  Diclofenac Sodium (VOLTAREN) 1 %, , Disp: , Rfl:   •  ibuprofen (MOTRIN) 800 mg tablet, Take 1 tablet (800 mg total) by mouth 3 (three) times a day as needed for moderate pain, Disp: 60 tablet, Rfl: 0  •  ketorolac (TORADOL) 10 mg tablet, Take 1 tablet (10 mg total) by mouth every 6 (six) hours as needed for moderate pain, Disp: 10 tablet, Rfl: 0  •  lidocaine (Lidoderm) 5 %, Apply 1 patch topically over 12 hours daily Remove & Discard patch within 12 hours or as directed by MD, Disp: 6 patch, Rfl: 0  •  meloxicam (MOBIC) 15 mg tablet, , Disp: , Rfl:   •  capsicum (ZOSTRIX) 0.075 % topical cream, Apply topically 3 (three) times a day, Disp: 28.3 g, Rfl: 0  •  pregabalin (LYRICA) 75 mg capsule, Take 1 capsule (75 mg total) by mouth 2 (two) times a day Start by taking 1 capsule at bedtime for 2 days. Then increase to 1 capsule in the evening and 1 capsule at bedtime. , Disp: 60 capsule, Rfl: 0    No Known Allergies    Physical Exam:    /70 (BP Location: Left arm, Patient Position: Sitting, Cuff Size: Standard)   Pulse 55   Wt 82.5 kg (181 lb 12.8 oz)   BMI 29.34 kg/m²     Constitutional:normal, well developed, well nourished, alert, in no distress and non-toxic and no overt pain behavior. Eyes:anicteric  HEENT:grossly intact  Neck:supple, symmetric, trachea midline and no masses   Pulmonary:even and unlabored  Cardiovascular:No edema or pitting edema present  Skin:Normal without rashes or lesions and well hydrated  Psychiatric:Mood and affect appropriate  Neurologic:Cranial Nerves II-XII grossly intact  Musculoskeletal:normal      Imaging    CT LUMBAR SPINE WITHOUT CONTRAST  7/12/23     INDICATION:   low back pain; herniated disc surgery last year.     COMPARISON: X-rays dated 3/1/2023     TECHNIQUE:  Contiguous axial images through the lumbar spine were obtained. Sagittal and coronal reconstructions were performed.     IV Contrast: Not given     Radiation dose length product (DLP) for this visit:  381 mGy-cm .   This examination, like all CT scans performed in the [facility], was performed utilizing techniques to minimize radiation dose exposure, including the use of iterative   reconstruction and automated exposure control.     IMAGE QUALITY:  Diagnostic.     FINDINGS:     ALIGNMENT:  There are 5 lumbar type vertebral bodies. The L5 vertebral body on the left is partially sacralized.     Exaggerated lumbar lordosis with grade 1 anterior spondylolisthesis of L4 upon L5. No spondylolysis. There are pedicle screws at the L3 and L4 levels bilaterally with short spinal rods. There is mild levoscoliosis of the lumbar spine centered at the L4   level.     VERTEBRAE:  No fracture. No lytic or blastic lesion.     DEGENERATIVE CHANGES:     Lower Thoracic spine: Mild lower thoracic degenerative change with mild annular bulging, endplate and posterior element hypertrophic changes at T10-11 with minor canal stenosis and foraminal narrowing. T11-12 and T12-1 levels.     L1-2: Slight anterior spondylolisthesis of L2 in relation to L1 with mild diffuse annular bulging. Mild canal stenosis. Minimal foraminal narrowing without nerve impingement.     L2-3: Moderate diffuse annular bulging. Minimal facet degenerative change. There is moderate tricompartmental canal stenosis and mild foraminal narrowing.     L3-4: Mild diffuse annular bulging. Small broad-based left subarticular and foraminal disc protrusion. Previous bilateral laminectomy. Posterior fusion with pedicle screws at both levels, appropriately positioned. Bilateral facet hypertrophic   degenerative changes present. Moderate tricompartmental canal stenosis worst within the left anterior lateral aspect of the central canal. Mild left greater than right foraminal narrowing.     L4-5: Grade 1 anterior spondylolisthesis of L4 upon L5. Moderate diffuse annular bulging with advanced facet hypertrophic degenerative change. There is severe central canal stenosis with mild bilateral foraminal narrowing.     L5-S1: Mild endplate and bilateral facet hypertrophic degenerative change. The left lateral elements are partially articulating with the superior aspect of the sacrum with some hypertrophic change present.  Mild canal stenosis and mild distal left   foraminal narrowing.     PARASPINAL SOFT TISSUES: Mild vascular calcification of the aorta and iliac arteries.     IMPRESSION:     Smooth levoscoliosis and exaggerated lumbar lordosis. Annular bulging with degenerative disc disease and posterior element hypertrophic change resulting in severe canal stenosis at L4-5 as well as moderate canal stenosis at the L2-3 and L3-4 levels. At   L3-4 there is a left paramedian disc herniation present.     Spine surgery consultation recommended.     No acute osseous abnormality.     This examination was marked "immediate notification" in Epic in order to begin the standard process by which the radiology reading room liaison alerts the referring practitioner.   No orders to display         Orders Placed This Encounter   Procedures   • Durable Medical Equipment   • Ambulatory referral to Orthopedic Surgery

## 2023-07-18 NOTE — TELEPHONE ENCOUNTER
Call placed to the patient per [facility] referral.     V/M left (IN [facility]) for patient to call back. Phone number and hours of business provided.      This is the 2nd attempt to reach the patient. Will defer per protocol.     NOTE: Pt is established with [email or web address] for low back.  She is also established with PM Dr Tran Domínguez

## 2023-07-19 ENCOUNTER — OFFICE VISIT (OUTPATIENT)
Dept: PAIN MEDICINE | Facility: CLINIC | Age: 68
End: 2023-07-19
Payer: COMMERCIAL

## 2023-07-19 VITALS
HEART RATE: 55 BPM | DIASTOLIC BLOOD PRESSURE: 70 MMHG | WEIGHT: 181.8 LBS | SYSTOLIC BLOOD PRESSURE: 148 MMHG | BODY MASS INDEX: 29.34 KG/M2

## 2023-07-19 DIAGNOSIS — G89.29 CHRONIC BILATERAL LOW BACK PAIN WITH BILATERAL SCIATICA: ICD-10-CM

## 2023-07-19 DIAGNOSIS — M54.42 CHRONIC BILATERAL LOW BACK PAIN WITH BILATERAL SCIATICA: ICD-10-CM

## 2023-07-19 DIAGNOSIS — M54.41 CHRONIC BILATERAL LOW BACK PAIN WITH BILATERAL SCIATICA: ICD-10-CM

## 2023-07-19 DIAGNOSIS — G89.4 CHRONIC PAIN SYNDROME: ICD-10-CM

## 2023-07-19 DIAGNOSIS — Z98.1 HISTORY OF LUMBAR FUSION: ICD-10-CM

## 2023-07-19 DIAGNOSIS — M48.062 SPINAL STENOSIS OF LUMBAR REGION WITH NEUROGENIC CLAUDICATION: Primary | ICD-10-CM

## 2023-07-19 DIAGNOSIS — G62.9 DEMYELINATING NEUROPATHY: ICD-10-CM

## 2023-07-19 PROCEDURE — 99214 OFFICE O/P EST MOD 30 MIN: CPT | Performed by: STUDENT IN AN ORGANIZED HEALTH CARE EDUCATION/TRAINING PROGRAM

## 2023-07-19 RX ORDER — PREGABALIN 75 MG/1
CAPSULE ORAL
COMMUNITY
Start: 2023-02-24 | End: 2023-07-19 | Stop reason: SDUPTHER

## 2023-07-19 RX ORDER — CAPSAICIN 0.75 MG/G
CREAM TOPICAL 3 TIMES DAILY
Qty: 28.3 G | Refills: 0 | Status: SHIPPED | OUTPATIENT
Start: 2023-07-19

## 2023-07-19 RX ORDER — PREGABALIN 75 MG/1
75 CAPSULE ORAL 2 TIMES DAILY
Qty: 60 CAPSULE | Refills: 0 | Status: SHIPPED | OUTPATIENT
Start: 2023-07-19

## 2023-07-19 RX ORDER — MELOXICAM 15 MG/1
TABLET ORAL
COMMUNITY
Start: 2023-06-01 | End: 2023-07-19

## 2023-07-19 RX ORDER — ACETAMINOPHEN 325 MG/1
650 TABLET ORAL EVERY 6 HOURS PRN
COMMUNITY

## 2023-07-19 RX ORDER — GABAPENTIN 600 MG/1
600 TABLET ORAL 3 TIMES DAILY
Qty: 90 TABLET | Refills: 1 | Status: CANCELLED | OUTPATIENT
Start: 2023-07-19

## 2023-07-19 NOTE — PATIENT INSTRUCTIONS
[facility] (Neisha Teddy, Diclofex DC, Tratamiento para la artritis [address], [place]) - (Sobre la piel)   ECHO is off. Para qué se utiliza chadd medicamento:   Darlyn el dolor de los músculos, las articulaciones y los nervios. También trata el dolor nervioso causado por la neuralgia posherpética o la neuropatía diabética periférica de los pies. Comuníquese de inmediato con un médico o enfermera si usted tiene:  Visión borrosa, mareos, dolor de [place]  Sensación de Lind kelsea o ardor  Ritmo cardíaco acelerado, lento, nicholas o irregular  Irritación severa, dolor, enrojecimiento o hinchazón de la piel o los ojos  [place], escalofríos, tos, dolor de garganta, congestión o flujo nasal, estornudos, dificultad para respirar, david en el cuerpo     Efectos secundarios comunes:  Cambios o pérdida del gusto  Náuseas, vómitos  Picazón o sequedad en [address] 2022 Information is for [employer] use only and may not be sold, redistributed or otherwise used for commercial purposes. Pregabalina (Lyrica, Lyrica CR) - (Por vía oral)   ECHO is off. Para qué se utiliza chadd medicamento:   Trata el dolor de nervios y músculos, incluyendo la fibromialgia. También trata las convulsiones de inicio parcial.  Comuníquese de inmediato con un médico o enfermera si usted tiene:  Ampollas, descamación, sarpullido ramires, coloración Kimberly, las uñas o la piel, dificultad para respirar, dolor de pecho  Inflamación en la garganta, mervin o moose  Mareos o somnolencia severos, latidos cardíacos irregulares, visión doble o borrosa  Cambios de humor repentinos, estado de ánimo o comportamiento inusuales, catie zane extrema o depresión  Aumento rápido de [place], Best Amanda, los tobillos o los rodney Araujo, moretones o debilidad inusuales.   Dolor muscular, sensibilidad, debilidad, fiebre, dolor de garganta     Efectos secundarios comunes:  Estreñimiento, sequedad en la boca, confusión, dificultad para concentrarse    © Copyright [employer] 2022 Information is for End User's use only and may not be sold, redistributed or otherwise used for commercial purposes.

## 2023-07-24 ENCOUNTER — OFFICE VISIT (OUTPATIENT)
Dept: OBGYN CLINIC | Facility: CLINIC | Age: 68
End: 2023-07-24
Payer: COMMERCIAL

## 2023-07-24 VITALS
HEIGHT: 66 IN | BODY MASS INDEX: 29.28 KG/M2 | HEART RATE: 56 BPM | SYSTOLIC BLOOD PRESSURE: 117 MMHG | DIASTOLIC BLOOD PRESSURE: 76 MMHG | WEIGHT: 182.2 LBS

## 2023-07-24 DIAGNOSIS — M48.062 SPINAL STENOSIS OF LUMBAR REGION WITH NEUROGENIC CLAUDICATION: Primary | ICD-10-CM

## 2023-07-24 DIAGNOSIS — Z98.1 HISTORY OF LUMBAR FUSION: ICD-10-CM

## 2023-07-24 PROCEDURE — 99204 OFFICE O/P NEW MOD 45 MIN: CPT | Performed by: ORTHOPAEDIC SURGERY

## 2023-07-24 NOTE — PROGRESS NOTES
Susannah Palacio MD  [address] [place] [address]  [phone]    HISTORY OF PRESENT ILLNESS:    Yamil Romano is a 75 y.o. male who presents for initial evaluation of low back pain. The patient had surgery 1 year and 3 months ago in [place] and he lives here now. He attended physical therapy at [facility] in March and April. He has numbness and tingling in his legs. He cannot walk without pain. ALLERGIES: No Known Allergies    MEDICATIONS:    Current Outpatient Medications:   •  acetaminophen (TYLENOL) 325 mg tablet, Take 650 mg by mouth every 6 (six) hours as needed, Disp: , Rfl:   •  capsicum (ZOSTRIX) 0.075 % topical cream, Apply topically 3 (three) times a day, Disp: 28.3 g, Rfl: 0  •  cyclobenzaprine (FLEXERIL) 5 mg tablet, Take 1 tablet (5 mg total) by mouth daily at bedtime, Disp: 10 tablet, Rfl: 0  •  Diclofenac Sodium (VOLTAREN) 1 %, , Disp: , Rfl:   •  lidocaine (Lidoderm) 5 %, Apply 1 patch topically over 12 hours daily Remove & Discard patch within 12 hours or as directed by MD, Disp: 6 patch, Rfl: 0  •  pregabalin (LYRICA) 75 mg capsule, Take 1 capsule (75 mg total) by mouth 2 (two) times a day Start by taking 1 capsule at bedtime for 2 days. Then increase to 1 capsule in the evening and 1 capsule at bedtime. , Disp: 60 capsule, Rfl: 0     PAST MEDICAL HISTORY:   History reviewed. No pertinent past medical history. PAST SURGICAL HISTORY:  Past Surgical History:   Procedure Laterality Date   • BACK SURGERY     • SPINE SURGERY      herniated discs       SOCIAL HISTORY:  Social History     Tobacco Use   Smoking Status Never   Smokeless Tobacco Never        ROS:  Review of Systems   Constitutional: Negative for appetite change and unexpected weight change. HENT: Negative for congestion and trouble swallowing. Eyes: Negative for visual disturbance. Respiratory: Negative for cough and shortness of breath.     Cardiovascular: Negative for chest pain and palpitations. Gastrointestinal: Negative for nausea and vomiting. Endocrine: Negative for cold intolerance and heat intolerance. Musculoskeletal: Positive for back pain. Negative for joint swelling and myalgias. Skin: Negative for rash. Neurological: Negative for numbness. PHYSICAL EXAM:  75 y.o. male sitting comfortably on exam chair in no acute distress. Patient ambulates without normal gait. Ambulates slowly, leaning slightly forward, loss of lordosis. Sensation intact to light touch left L2 through S1 dermatomes. Sensation intact to light touch right L2 through S1 dermatomes     Symmetric deep tendon reflexes bilateral upper extremities    1+ biceps and triceps   2+ radial   Symmetric deep tendon reflexes bilateral lower extremities     2+ knees   0 ankles     Haynes's sign negative  The patient is well perfused distally. RADIOGRAPHIC STUDIES:  1. MRI, Lumbar spine, 12/29/22: REPORT ONLY: Operative changes related to interval posterior fusion at L3-L4. Spondylosis contributing to varying degrees of spinal canal and foraminal stenosis as described above including high-grade spinal canal stenosis at L2-L3 and L4-L5 and moderate bilateral foraminal stenosis at L3-L4. Stable grade 1 anterolisthesis of L4 over L5 which is presumably related to facet arthropathy. 2. X-rays, Lumbar spine, 11/16/2021: Multilevel lumbar degenerative disc and facet arthrosis. Lumbarization of the S1 vertebral body with bilateral Bertolotti articulation. 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 Degenerative spondylolisthesis L5-S1.    3. X-rays, lumbar spine, 3/1/2023: Multilevel lumbar degenerative disc and facet arthrosis. There is evidence of posterior laminectomy at L3-4 in addition to posterior instrumentation with very long rods. There is no indication of instrumentation or decompression at L4-5. There is progression of spondylolisthesis compared to prior films from 2021.     4. CT, lumbar spine, 7/12/23: Moderate to severe multilevel lumbar fusion. Prior at L3-4. Severe stenosis at L4-5 and to lesser extent at other levels of the lumbar spine. ASSESSMENT:  1. Spinal stenosis of lumbar region with neurogenic claudication  -     Ambulatory referral to Orthopedic Surgery  -     Ambulatory Referral to Physical Therapy; Future    2. History of lumbar fusion  -     Ambulatory referral to Orthopedic Surgery  -     Ambulatory Referral to Physical Therapy; Future        PLAN:  75 y.o. male with spinal stenosis, history of lumbar fusion. The patient's x-rays and CT scan were reviewed today. Interpretation services were used for the entirety of the visit. The natural history of spinal stenosis was discussed with the patient today. The best plan of care for the patient is to attend aqua therapy. The patient will trial aqua therapy for a minimum of 5 weeks. After the first and second visits there will be increased back pain due to use of musculature that has not been used in a while. Once aqua therapy is completed, if they have success, they will continue with aqua therapy. If symptoms do not improve, we will move forward with the next steps. It was discussed he needs to obtain a copy of his MRI study for further evaluation. I will see the patient back in approximately 6 weeks for re-evaluation.           Scribe Attestation    I,:  Elba Daley am acting as a scribe while in the presence of the attending physician.:       I,:  Robin Hernandez MD personally performed the services described in this documentation    as scribed in my presence.:

## 2023-07-24 NOTE — PATIENT INSTRUCTIONS
Obtenga un estudio de resonancia magnética previo y lo haya colocado en un disco. La resonancia magnética tiene que ser solicitada por gibson, el Aamir. 12/29/22    Estenosis de columna lumbar   LO QUE NECESITA SABER:   ¿Qué es la estenosis de la columna lumbar? La estenosis de la columna lumbar es un estrechamiento del canal reyes en el inferior de reed espalda. El canal reyes es la apertura de la columna vertebral que contiene la médula reyes. Cuando el canal reyes se estrecha, puede ejercer presión sobre la Tamir Backers y los nervios. ¿Qué causa la estenosis de la columna lumbar? El estrechamiento del canal reyes puede ser consecuencia de cambios que ocurren a medida que crece. Estos cambios incluyen espolones óseos (crecimientos), hernias de disco y engrosamiento de ligamentos. Los crecimientos óseos pueden ser consecuencia de la artrosis. Claus hernia de disco sobresale entre las vértebras [place]) y hacia el canal [address] discos son catie cojines esponjosos que se encuentran entre las vértebras de la columna. Las hernias de disco pueden ser consecuencia de actividades que aumentan la presión que se ejerce sobre la columna. Un ejemplo es levantar cosas pesadas. Los ligamentos que conectan las vértebras pueden engrosarse y endurecerse a medida que envejecemos. Otras afecciones, catie las lesiones de la columna y la enfermedad de [place], también pueden causar la estenosis reyes.  ¿Cuáles son los signos y síntomas de la estenosis de la columna lumbar? Los signos y síntomas pueden comenzar o empeorar cuando se encuentra de pie o camina. A menudo se alivian cuando se sienta o se inclina hacia adelante. Dolor en la parte baja de la espalda    Dolor, entumecimiento, sensación de hormigueo o debilidad en claus o en ambas piernas    Dolor en los glúteos que se extiende a los muslos o las piernas    ¿Cómo se diagnostica la estenosis de la columna lumbar?  Reed médico le preguntará acerca de kristen síntomas y cuándo comenzaron. Link Flake si usted tiene [place]. Es posible que el médico le pida que se levante, se incline, camine, se siente o se estire. Noé Ambriz, claus resonancia magnética o claus tomografía computarizada pueden mostrar los problemas en la columna vertebral que causan la estenosis reyes. Es posible que le administren un líquido de contraste para que la columna se arnie con más claridad en las imágenes. Dígale al médico si usted alguna vez ha tenido claus reacción alérgica al líquido de [place]. No entre a la silke donde se realiza la resonancia magnética con algo de metal. El metal puede causar lesiones serias. Dígale al médico si usted tiene algo de metal dentro de reed cuerpo o por encima. ¿Cómo se trata la estenosis de la columna lumbar? WADE catie el ibuprofeno, ayudan a disminuir la inflamación, el dolor y la [place]. Los WADE pueden causar sangrado estomacal o problemas renales en ciertas personas. Si usted april un medicamento anticoagulante, siempre pregúntele a reed médico si los WADE son seguros para usted. Siempre josee la etiqueta de chadd medicamento y [address] instrucciones. Acetaminofén [employer] dolor y baja la fiebre. Está disponible sin receta médica. Pregunte la cantidad y la frecuencia con que debe tomarlos. [address] Josee las etiquetas de todos los demás medicamentos que esté usando para saber si también contienen acetaminofén, o pregunte a reed médico o farmacéutico. El acetaminofén puede causar daño en el hígado cuando no se april de forma correcta. Los analgésicos recetados podrían administrarse. Pregunte cómo marine estos medicamentos de claus forma saenz. Relajantes musculares ayudan a reducir dolor y espasmos musculares. Claus inyección de esteroides podrían ser administrados para reducir la inflamación.  Los medicamentos esteroideos se inyectan dentro del espacio epidural. El espacio epidural se encuentra entre reed médula reyes y las vértebras. Un bloque nervioso es claus inyección de medicamentos anestésicos. Puede que usted necesite un bloqueo nervioso si mitchell dolor no desaparece o empeora. La cirugía puede necesitarse para ensanchar mitchell canal reyes o disminuir la presión en mitchell médula reyes. La cirugía puede también realizarse para reparar vértebras en mitchell espalda que están dañadas o lesionadas. ¿Cómo puedo controlar los síntomas? Magaly Mano a fisioterapia y [address] Un fisioterapeuta le puede enseñar ejercicios para ayudarle a mejorar el movimiento y la fuerza, y para disminuir el dolor. Un terapeuta ocupacional le enseña habilidades para ayudarlo con demi actividades diarias. Descanse cuando sienta que es necesario. Empiece a hacer un poco más día a día. Regrese a demi actividades diarias catie se le haya indicado. Aplique calor en la espalda de 20 a 30 minutos cada 2 horas por los [employer] indiquen. El calor ayuda a disminuir el dolor y los espasmos musculares. Aplique hielo en la espalda de 15 a 20 minutos cada hora o catie se le indique. Use claus compresa de hielo o ponga hielo triturado en claus bolsa de plástico. Cami Bell con claus toalla antes de aplicarlo sobre mitchell piel. El hielo ayuda a evitar daño al tejido y a disminuir la inflamación y el dolor. ¿Cuándo dionicio buscar atención inmediata? Usted tiene [employer] mitchell pierna que no desaparece o que empeora. Usted tiene dificultad para  las piernas. Usted no puede controlar cuándo [place] o tiene claus evacuación intestinal.    ¿Cuándo dionicio comunicarme con mi médico?  Usted tiene nuevos síntomas o demi síntomas empeoran. Demi síntomas le impiden realizar demi actividades diarias. Usted tiene preguntas o inquietudes acerca de mitchell condición o cuidado. ACUERDOS SOBRE MITCHELL CUIDADO:   Usted tiene el derecho de ayudar a planear mitchell cuidado. Aprenda todo lo que pueda sobre mitchell condición y catie darle tratamiento.  [address] con kristen médicos para decidir el cuidado que usted desea recibir. Usted siempre tiene el derecho de rechazar el tratamiento. Esta información es sólo para uso en educación. Reed intención no es darle un consejo médico sobre enfermedades o tratamientos. Colsulte con reed Mary Richter farmacéutico antes de seguir cualquier régimen médico para saber si es seguro y efectivo para usted. © Copyright Venetta Snide 2022 Information is for End User's use only and may not be sold, redistributed or otherwise used for commercial purposes.

## 2023-07-25 ENCOUNTER — TELEPHONE (OUTPATIENT)
Dept: PAIN MEDICINE | Facility: CLINIC | Age: 68
End: 2023-07-25

## 2023-07-25 NOTE — TELEPHONE ENCOUNTER
[facility]     Doctor: Beryle Knee     Reason for call: they need script for Tens unit pads or electrodes     Call back#: [phone]  FAX # [phone]

## 2023-08-01 ENCOUNTER — TELEPHONE (OUTPATIENT)
Age: 68
End: 2023-08-01

## 2023-08-01 NOTE — TELEPHONE ENCOUNTER
Caller: Emily Rey    Doctor: Rae Henderson    Reason for call: Please fax order for aquatic therapy     Call Fax#: [phone]

## 2023-09-08 ENCOUNTER — TELEPHONE (OUTPATIENT)
Dept: OBGYN CLINIC | Facility: CLINIC | Age: 68
End: 2023-09-08

## 2023-09-08 NOTE — TELEPHONE ENCOUNTER
Called patient and left a message to advise him that his LYFT ride has been arranged and will pick him up at 8:00 a.m.

## 2023-09-11 ENCOUNTER — TELEPHONE (OUTPATIENT)
Age: 68
End: 2023-09-11

## 2023-09-11 ENCOUNTER — OFFICE VISIT (OUTPATIENT)
Dept: OBGYN CLINIC | Facility: CLINIC | Age: 68
End: 2023-09-11
Payer: COMMERCIAL

## 2023-09-11 ENCOUNTER — HOSPITAL ENCOUNTER (OUTPATIENT)
Dept: MRI IMAGING | Facility: HOSPITAL | Age: 68
Discharge: HOME | End: 2023-09-11
Payer: COMMERCIAL

## 2023-09-11 VITALS
BODY MASS INDEX: 28.42 KG/M2 | HEIGHT: 66 IN | DIASTOLIC BLOOD PRESSURE: 77 MMHG | SYSTOLIC BLOOD PRESSURE: 137 MMHG | HEART RATE: 60 BPM | WEIGHT: 176.8 LBS

## 2023-09-11 DIAGNOSIS — G95.9 MYELOPATHY (HCC): ICD-10-CM

## 2023-09-11 DIAGNOSIS — M48.062 SPINAL STENOSIS OF LUMBAR REGION WITH NEUROGENIC CLAUDICATION: Primary | ICD-10-CM

## 2023-09-11 DIAGNOSIS — Z98.1 HISTORY OF LUMBAR FUSION: ICD-10-CM

## 2023-09-11 PROCEDURE — 72146 MRI CHEST SPINE W/O DYE: CPT

## 2023-09-11 PROCEDURE — 99214 OFFICE O/P EST MOD 30 MIN: CPT | Performed by: ORTHOPAEDIC SURGERY

## 2023-09-11 PROCEDURE — G1004 CDSM NDSC: HCPCS

## 2023-09-11 NOTE — PROGRESS NOTES
[facility] ORTHOPEDIC SPINE SURGERY  Kiara Gibbs MD  [address] [place] [address]  [phone]    HISTORY OF PRESENT ILLNESS:    Mikala Olivia is a 75 y.o. male who presents for follow-up evaluation of lumbar spine. Patient was last seen in the office on 7/24/2023 where patient was referred to aqua therapy. Patient is Kyrgyz speaking. Patient's sister was present at examination today and assisted with translation. Patient reports he went to five aqua therapy sessions without relief of symptoms. Patient states he has pain in his buttocks/low back that goes down the right lower extremity into the foot. Patient also reports having numbness/tingling in bilateral feet. Patient states he had two prior spinal injections given by Dr. Ptaito Barnett. The most recent was around 3 months ago. Patient states injections did not provide relief. ALLERGIES: No Known Allergies    MEDICATIONS:    Current Outpatient Medications:   •  acetaminophen (TYLENOL) 325 mg tablet, Take 650 mg by mouth every 6 (six) hours as needed, Disp: , Rfl:   •  capsicum (ZOSTRIX) 0.075 % topical cream, Apply topically 3 (three) times a day, Disp: 28.3 g, Rfl: 0  •  cyclobenzaprine (FLEXERIL) 5 mg tablet, Take 1 tablet (5 mg total) by mouth daily at bedtime, Disp: 10 tablet, Rfl: 0  •  Diclofenac Sodium (VOLTAREN) 1 %, , Disp: , Rfl:   •  lidocaine (Lidoderm) 5 %, Apply 1 patch topically over 12 hours daily Remove & Discard patch within 12 hours or as directed by MD, Disp: 6 patch, Rfl: 0  •  pregabalin (LYRICA) 75 mg capsule, Take 1 capsule (75 mg total) by mouth 2 (two) times a day Start by taking 1 capsule at bedtime for 2 days. Then increase to 1 capsule in the evening and 1 capsule at bedtime. , Disp: 60 capsule, Rfl: 0     PAST MEDICAL HISTORY:   History reviewed. No pertinent past medical history.     PAST SURGICAL HISTORY:  Past Surgical History:   Procedure Laterality Date   • BACK SURGERY     • SPINE SURGERY      herniated discs       SOCIAL HISTORY:  Social History     Tobacco Use   Smoking Status Never   Smokeless Tobacco Never          PHYSICAL EXAM:  75 y.o. male sitting comfortably on exam chair in no apparent distress. Ambulates with unsteady and antalgic gait  Unable to balance on one leg  Unable to go up on toes and heels  No significant tenderness to palpation over thoracic or lumbar spine. 5/5 motor strength in bilateral lower extremities  Diminished sensation to light touch bilateral lower extremities  Absent achilles reflexes bilaterally  3+ patellar reflex on right   2+ patellar reflex on left      RADIOGRAPHIC STUDIES:  1. MRI, Lumbar spine, 12/29/22: Status post L3-4 posterior spinal fusion and central laminectomy. There is evidence of persistent spinal stenosis. There is also evidence of severe spinal stenosis at L4-5, L5-S1. Severe stenosis is also present at L1-2 and L2-3. Abnormal signal is present on the spinal cord in the lower thoracic spine. Axial images and at L1 and the thoracolumbar spine cannot be fully visualized. 2. X-rays, Lumbar spine, 11/16/2021: Multilevel lumbar degenerative disc and facet arthrosis. Lumbarization of the S1 vertebral body with bilateral Bertolotti articulation. 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 Degenerative spondylolisthesis L5-S1.     3. X-rays, lumbar spine, 3/1/2023: Multilevel lumbar degenerative disc and facet arthrosis. There is evidence of posterior laminectomy at L3-4 in addition to posterior instrumentation with very long rods. There is no indication of instrumentation or decompression at L4-5. There is progression of spondylolisthesis compared to prior films from 2021.     4. CT, lumbar spine, 7/12/23: Moderate to severe multilevel lumbar degenerative disc disease. There is evidence of posterior spinal fusion at L3-4. The decompression has been performed centrally. The facets are primarily intact and there is evidence of lateral recess stenosis present.   Severe stenosis at L4-5 and to lesser extent at other levels of the lumbar spine.         ASSESSMENT:  1. Spinal stenosis of lumbar region with neurogenic claudication    2. History of lumbar fusion        PLAN:  75 y.o. male with spinal stenosis, history of lumbar fusion. Based on preoperative x-rays, the claimant was suffering from spondylosis at L5-S1. I have not reviewed the preoperative MRI study. Postoperative MRI study does show evidence of significant stenosis throughout the lumbar spine even at the decompressed and instrumented level. There is also evidence of instability in form of spondylolisthesis. More importantly, there is evidence of stenosis on the CT scan of the thoracic spine and an MRI study of the lumbar spine, there is an abnormal signal at the lower thoracic spine. MRI of lumbar spine from December 2022 was reviewed in the office today showing multilevel severe spinal stenosis. It was discussed today there are several issues occurring. The first is patient is suffering from multilevel spinal stenosis. Natural history of spinal stenosis was again discussed today. Patient has failed both injections and physical therapy at this point. Patient will need an updated MRI of lumbar spine for further evaluation at this time prior to considering surgical intervention. The second issue is there is evidence of abnormal cord signal around T12 as well as spinal stenosis seen at that level on recent CT. Given hyperreflexia on exam and gait instability, patient requires MRI of thoracic spine to rule out cord compression. Patient will follow-up once MRIs result.       Scribe Attestation    I,:  Corina Knight PA-C am acting as a scribe while in the presence of the attending physician.:       I,:  Gerardo Rogers MD personally performed the services described in this documentation    as scribed in my presence.:

## 2023-09-11 NOTE — TELEPHONE ENCOUNTER
Caller: Patient    Doctor: Maria Elena Holly    Reason for call:     Patient has an appointment with Dr Maria Elena Holly this morning at 9:15 am, he was suppose to have a lyft ride scheduled  , but the Lyft ride did not show. When I verified his phone number it was not updated in the system, the phone number [phone]. Call back#: [phone].

## 2023-09-18 ENCOUNTER — HOSPITAL ENCOUNTER (OUTPATIENT)
Dept: MRI IMAGING | Facility: CLINIC | Age: 68
Discharge: HOME | End: 2023-09-18
Payer: COMMERCIAL

## 2023-09-18 DIAGNOSIS — Z98.1 HISTORY OF LUMBAR FUSION: ICD-10-CM

## 2023-09-18 DIAGNOSIS — M48.062 SPINAL STENOSIS OF LUMBAR REGION WITH NEUROGENIC CLAUDICATION: ICD-10-CM

## 2023-09-18 PROCEDURE — 72148 MRI LUMBAR SPINE W/O DYE: CPT

## 2023-09-18 PROCEDURE — G1004 CDSM NDSC: HCPCS

## 2023-09-25 ENCOUNTER — TELEPHONE (OUTPATIENT)
Dept: NEUROLOGY | Facility: CLINIC | Age: 68
End: 2023-09-25

## 2023-09-25 ENCOUNTER — OFFICE VISIT (OUTPATIENT)
Dept: OBGYN CLINIC | Facility: CLINIC | Age: 68
End: 2023-09-25
Payer: COMMERCIAL

## 2023-09-25 VITALS — BODY MASS INDEX: 28.61 KG/M2 | HEIGHT: 66 IN | WEIGHT: 178 LBS

## 2023-09-25 DIAGNOSIS — M48.062 SPINAL STENOSIS OF LUMBAR REGION WITH NEUROGENIC CLAUDICATION: Primary | ICD-10-CM

## 2023-09-25 DIAGNOSIS — Z98.1 HISTORY OF LUMBAR FUSION: ICD-10-CM

## 2023-09-25 PROCEDURE — 99214 OFFICE O/P EST MOD 30 MIN: CPT | Performed by: ORTHOPAEDIC SURGERY

## 2023-09-25 NOTE — PROGRESS NOTES
Susannah Palacio MD  [address] [place] [address]  [phone]    HISTORY OF PRESENT ILLNESS:    Umesh Folwer is a 75 y.o. 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 The patient was last seen in the office on 9/11/23 where a STAT MRI was ordered of the patient's thoracic spine to rule out spinal cord compression. He reports he stopped aqua therapy in August. He did 7 sessions without relief in symptoms. He had injections about 2 months ago without relief in symptoms. He is a  and wonders if he should look for a different job. ALLERGIES: No Known Allergies    MEDICATIONS:    Current Outpatient Medications:   •  acetaminophen (TYLENOL) 325 mg tablet, Take 650 mg by mouth every 6 (six) hours as needed, Disp: , Rfl:   •  capsicum (ZOSTRIX) 0.075 % topical cream, Apply topically 3 (three) times a day, Disp: 28.3 g, Rfl: 0  •  cyclobenzaprine (FLEXERIL) 5 mg tablet, Take 1 tablet (5 mg total) by mouth daily at bedtime, Disp: 10 tablet, Rfl: 0  •  Diclofenac Sodium (VOLTAREN) 1 %, , Disp: , Rfl:   •  lidocaine (Lidoderm) 5 %, Apply 1 patch topically over 12 hours daily Remove & Discard patch within 12 hours or as directed by MD, Disp: 6 patch, Rfl: 0  •  pregabalin (LYRICA) 75 mg capsule, Take 1 capsule (75 mg total) by mouth 2 (two) times a day Start by taking 1 capsule at bedtime for 2 days. Then increase to 1 capsule in the evening and 1 capsule at bedtime. , Disp: 60 capsule, Rfl: 0     PAST MEDICAL HISTORY:   History reviewed. No pertinent past medical history.     PAST SURGICAL HISTORY:  Past Surgical History:   Procedure Laterality Date   • BACK SURGERY     • SPINE SURGERY      herniated discs       SOCIAL HISTORY:  Social History     Tobacco Use   Smoking Status Never   Smokeless Tobacco Never          PHYSICAL EXAM:  75 y.o. male sitting comfortably on exam chair in no apparent distress. Ambulates with unsteady and antalgic gait  Unable to balance on one leg  Unable to go up on toes and heels  No significant tenderness to palpation over thoracic or lumbar spine. 5/5 motor strength in bilateral lower extremities  Diminished sensation to light touch bilateral lower extremities  Absent achilles reflexes bilaterally  3+ patellar reflex on right   2+ patellar reflex on left      RADIOGRAPHIC STUDIES:  1.  MRI, Lumbar spine, 12/29/22: Status post L3-4 posterior spinal fusion and central laminectomy. There is evidence of persistent spinal stenosis. There is also evidence of severe spinal stenosis at L4-5, L5-S1. Severe stenosis is also present at L1-2 and L2-3. Abnormal signal is present on the spinal cord in the lower thoracic spine. Axial images and at L1 and the thoracolumbar spine cannot be fully visualized.     2. X-rays, Lumbar spine, 11/16/2021: Multilevel lumbar degenerative disc and facet arthrosis.  Lumbarization of the S1 vertebral body with bilateral Bertolotti articulation.  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  Degenerative spondylolisthesis L5-S1.     3. X-rays, lumbar spine, 3/1/2023: Multilevel lumbar degenerative disc and facet arthrosis. Merlynn Binder is evidence of posterior laminectomy at L3-4 in addition to posterior instrumentation with very long rods.  There is no indication of instrumentation or decompression at L4-5.  There is progression of spondylolisthesis compared to prior films from 2021.     4. CT, lumbar spine, 7/12/23: Moderate to severe multilevel lumbar degenerative disc disease. There is evidence of posterior spinal fusion at L3-4. The decompression has been performed centrally. The facets are primarily intact and there is evidence of lateral recess stenosis present.  Severe stenosis at L4-5 and to lesser extent at other levels of the lumbar spine.       5.  MRI, Thoracic spine, 9/11/23: Multilevel thoracic degenerative disc disease. No evidence of spinal cord compression or flattening. No some myelomalacia. There is 2 areas where the canal is slightly stenotic but this is not clinically significant. 6. MRI, Lumbar spine, 9/18/23: Transitional anatomy at the lumbosacral junction. There is a unilateral Bertolotti at L5-S1. L5-S1 disc is normal.  There is however evidence of facet arthrosis at L5-S1 resulting in mild to moderate lateral recess stenosis. At L4-5 there is evidence of degenerative spondylosis with severe central and lateral recess stenosis. At L3-4 there is evidence of prior central laminectomy. Persistent moderate lateral recess stenosis is present. There is evidence of instrumentation at that level. At L2-3 there is evidence of moderate lateral recess stenosis. And at L1-2 there is evidence of mild to moderate lateral recess stenosis. ASSESSMENT:  1. Spinal stenosis of lumbar region with neurogenic claudication  -     Ambulatory Referral to Physical Therapy; Future    2. History of lumbar fusion  -     Ambulatory Referral to Physical Therapy; Future        PLAN:  75 y.o. male with lumbar spinal stenosis, history of lumbar fusion, lumbar spondylolisthesis. The patient's x-rays and MRI studies were reviewed today. The patient has tried aqua therapy and has received epidural steroid injections without relief in symptoms. Due to this and the patient's persistent leg symptoms, he is a surgical candidate. Surgery would be in the form of L2-3, L3-4, L4-5, L5-S1 revision laminectomy, removal of hardware L4-5 with revision with fusion L3-5 and interbody fusion necessary at L4-5. The patient was informed there is no harm in delaying surgery currently. With time his symptoms can progress, eventually needing surgery in the future. The patient was understanding of this. I did discuss other treatment options including physical therapy and injections.   We are going to continue with physical therapy for short period of time to see if he can get him better. The patient was advised a job where he can sit would be best for him. Working as a  is strenuous and can irritate his back further. A new prescription was provided for the patient to continue with aqua therapy. The patient would like to exhaust conservative treatment prior to moving forward with surgery. I will see the patient back in approximately 2 months for re-evaluation.             Scribe Attestation    I,:  Med Carcamo am acting as a scribe while in the presence of the attending physician.:       I,:  Josue Zee MD personally performed the services described in this documentation    as scribed in my presence.:

## 2023-10-05 NOTE — TELEPHONE ENCOUNTER
I spoke to the patient using our  system. I asked if he has any prior records or imaging or testing for his visit with us, he said no he does not any prior records and has never seen anyone for this condition. Patient would like to know if his insurance is active and to give him a call back with the information. He stated that he is having an issue with his insurance and social security. If insurance is not active, he would like to reschedule.

## 2023-10-05 NOTE — TELEPHONE ENCOUNTER
I spoke with Jes via Teams, I asked her to verify if patient's insurance is active - she let me know that it is active and I called patient back to let him know. I spoke with patients sister.

## 2023-10-09 ENCOUNTER — OFFICE VISIT (OUTPATIENT)
Dept: NEUROLOGY | Facility: CLINIC | Age: 68
End: 2023-10-09
Payer: COMMERCIAL

## 2023-10-09 ENCOUNTER — APPOINTMENT (OUTPATIENT)
Dept: LAB | Facility: CLINIC | Age: 68
End: 2023-10-09
Payer: COMMERCIAL

## 2023-10-09 VITALS
SYSTOLIC BLOOD PRESSURE: 118 MMHG | HEIGHT: 66 IN | HEART RATE: 61 BPM | BODY MASS INDEX: 28.45 KG/M2 | WEIGHT: 177 LBS | OXYGEN SATURATION: 96 % | DIASTOLIC BLOOD PRESSURE: 62 MMHG

## 2023-10-09 DIAGNOSIS — M48.062 SPINAL STENOSIS OF LUMBAR REGION WITH NEUROGENIC CLAUDICATION: ICD-10-CM

## 2023-10-09 DIAGNOSIS — G62.9 NEUROPATHY: ICD-10-CM

## 2023-10-09 LAB
25(OH)D3 SERPL-MCNC: 30.8 NG/ML (ref 30–100)
CK SERPL-CCNC: 154 U/L (ref 39–308)
TSH SERPL DL<=0.05 MIU/L-ACNC: 3.19 UIU/ML (ref 0.45–4.5)

## 2023-10-09 PROCEDURE — 84165 PROTEIN E-PHORESIS SERUM: CPT

## 2023-10-09 PROCEDURE — 36415 COLL VENOUS BLD VENIPUNCTURE: CPT

## 2023-10-09 PROCEDURE — 84443 ASSAY THYROID STIM HORMONE: CPT

## 2023-10-09 PROCEDURE — 84446 ASSAY OF VITAMIN E: CPT

## 2023-10-09 PROCEDURE — 82306 VITAMIN D 25 HYDROXY: CPT

## 2023-10-09 PROCEDURE — 82085 ASSAY OF ALDOLASE: CPT

## 2023-10-09 PROCEDURE — 84425 ASSAY OF VITAMIN B-1: CPT

## 2023-10-09 PROCEDURE — 99244 OFF/OP CNSLTJ NEW/EST MOD 40: CPT | Performed by: PSYCHIATRY & NEUROLOGY

## 2023-10-09 PROCEDURE — 82550 ASSAY OF CK (CPK): CPT

## 2023-10-09 PROCEDURE — 84207 ASSAY OF VITAMIN B-6: CPT

## 2023-10-09 PROCEDURE — 84166 PROTEIN E-PHORESIS/URINE/CSF: CPT | Performed by: PSYCHIATRY & NEUROLOGY

## 2023-10-09 NOTE — PROGRESS NOTES
Laine Trujillo is a 75 y.o. male. Chief Complaint   Patient presents with   • Demyelinating neuropathy       Assessment:  1. Neuropathy    2. Spinal stenosis of lumbar region with neurogenic claudication        Plan:  Blood work for neuropathy. EMG of bilateral lower extremities. Arterial Doppler of bilateral lower extremity. Medical management as per pain specialist.      Discussion:  The MRI scan of the lumbar spine by report from 9/18/2023 was reported as developmental spinal canal stenosis within the lumbar canal from L1-L5, patient has undergone previous posterior decompression at the L3-L4 level stable moderate canal stenosis at this level and L4-L5 level there is severe tricompartmental canal stenosis. MRI of the thoracic spine was reported as facet degenerative changes lower thoracic spine result in mild central stenosis at T11-T12, punctate focus of cord signal abnormality dorsally in the cord at this level noted potentially minimal myelomalacia. Differential diagnosis discussed with the patient, patient has lumbar spinal stenosis and also has clinical signs and symptoms of neuropathy, according to the patient he has not had any EMG test done would recommend EMG of bilateral lower extremities blood work for neuropathy and arterial Dopplers of lower extremity to evaluate for his symptoms, patient to call me after the above test to discuss the results.     He was advised to continue follow-up with his family physician and with his orthopedic spine surgeon and continue with his physical therapy he was supposed to be on Lyrica 75 mg twice a day which has been ordered by his family physician which patient has not yet started he is going to start that as recommended, he was advised to keep his blood pressure cholesterol sugar under control, to go to the hospital if has any worsening symptoms and call me depending on the above test will decide further management, management,  [id number] was used and patient understood all the instructions and did not have any further questions    Subjective:    HPI     59-year-old male with history of lumbar spinal stenosis with neurogenic claudication and numbness and tingling in his feet up to his knees, patient speaks Telugu and an  was used according to the patient he has been having the symptoms for the last 1-1/2 years sometimes he might have symptoms in the upper extremities but mostly is confined to the lower extremities he is in follow-up with orthopedic spine surgeon and with the pain specialist, patient was being prescribed gabapentin 300 mg 3 times a day that was changed by his pain specialist to Lyrica 75 mg twice a day which the patient has not yet started.     He complains of low back pain about 8 on 10 it does radiate to both legs, he also complains of numbness and tingling in the soles of the feet and if he stands for more than 10 to 15 minutes he feels that his legs feel uncomfortable and he gets swelling of his legs and he is not able to walk much he saw an orthopedic spine surgeon and was recommended surgery if conservative management fails patient is in physical therapy, he denies any bowel or bladder incontinence no focal weakness he has not had any falls,  Vitals:    10/09/23 1017   BP: 118/62   BP Location: Left arm   Patient Position: Sitting   Cuff Size: Large   Pulse: 61   SpO2: 96%   Height: 5' 6" (1.676 m)       Current Medications    Current Outpatient Medications:   •  acetaminophen (TYLENOL) 325 mg tablet, Take 650 mg by mouth every 6 (six) hours as needed (Patient not taking: Reported on 10/9/2023), Disp: , Rfl:   •  capsicum (ZOSTRIX) 0.075 % topical cream, Apply topically 3 (three) times a day (Patient not taking: Reported on 10/9/2023), Disp: 28.3 g, Rfl: 0  •  cyclobenzaprine (FLEXERIL) 5 mg tablet, Take 1 tablet (5 mg total) by mouth daily at bedtime (Patient not taking: Reported on 10/9/2023), Disp: 10 tablet, Rfl: 0  • Diclofenac Sodium (VOLTAREN) 1 %, , Disp: , Rfl:   •  lidocaine (Lidoderm) 5 %, Apply 1 patch topically over 12 hours daily Remove & Discard patch within 12 hours or as directed by MD (Patient not taking: Reported on 10/9/2023), Disp: 6 patch, Rfl: 0  •  pregabalin (LYRICA) 75 mg capsule, Take 1 capsule (75 mg total) by mouth 2 (two) times a day Start by taking 1 capsule at bedtime for 2 days. Then increase to 1 capsule in the evening and 1 capsule at bedtime. (Patient not taking: Reported on 10/9/2023), Disp: 60 capsule, Rfl: 0      Allergies  Patient has no known allergies. Past Medical History  History reviewed. No pertinent past medical history. Past Surgical History:  Past Surgical History:   Procedure Laterality Date   • BACK SURGERY     • SPINE SURGERY      herniated discs         Family History:  History reviewed. No pertinent family history. Social History:   reports that he has never smoked. He has never used smokeless tobacco. He reports that he does not drink alcohol and does not use drugs. I have reviewed the past medical history, surgical history, social and family history, current medications, allergies vitals, review of systems, and updated this information as appropriate today. Objective:    Physical Exam    Neurological Exam     GENERAL:  Cooperative in no acute distress. Well-developed and well-nourished     HEAD and NECK   Head is atraumatic normocephalic with no lesions or masses. Neck is supple with full range of motion     CARDIOVASCULAR  Carotid Arteries-no carotid bruits. NEUROLOGIC:  Mental Status-the patient is awake alert and oriented without aphasia or apraxia  Cranial Nerves: Visual fields are full to confrontation. Visual acuity is 20/20 with hand-held chart extraocular movements are full without nystagmus. Pupils are 2-1/2 mm and reactive. Face is symmetrical to light touch. Movements of facial expression move symmetrically.  Hearing is normal to finger rub bilaterally. Soft palate lifts symmetrically. Shoulder shrug is symmetrical. Tongue is midline without atrophy. Motor: No drift is noted on arm extension. Strength is full in the upper and lower extremities with normal bulk and tone. Sensory: Decreased light touch pinprick temperature sensation in glove and stocking distribution cortical function is intact. Coordination: Finger to nose testing is performed accurately. Romberg is swaying gait reveals a normal base with symmetrical arm swing. Had difficulty in tandem walking  Reflexes:   1+ and symmetrical    toes are downgoing  Paraspinal muscle tenderness in the lumbar area    ROS:  Review of Systems   Constitutional: Negative for appetite change, fatigue and fever. HENT: Negative. Negative for hearing loss, tinnitus, trouble swallowing and voice change. Eyes: Negative. Negative for photophobia, pain and visual disturbance. Respiratory: Negative. Negative for shortness of breath. Cardiovascular: Negative. Negative for palpitations. Gastrointestinal: Negative. Negative for nausea and vomiting. Endocrine: Negative. Negative for cold intolerance. Genitourinary: Negative. Negative for dysuria, frequency and urgency. Musculoskeletal: Positive for gait problem. Negative for back pain, myalgias and neck pain. Skin: Negative. Negative for rash. Allergic/Immunologic: Negative. Neurological: Positive for numbness. Negative for dizziness, tremors, seizures, syncope, facial asymmetry, speech difficulty, weakness, light-headedness and headaches. Hematological: Negative. Does not bruise/bleed easily. Psychiatric/Behavioral: Negative. Negative for confusion, hallucinations and sleep disturbance.

## 2023-10-10 LAB — ALDOLASE SERPL-CCNC: 4.8 U/L (ref 3.3–10.3)

## 2023-10-11 LAB
ALBUMIN SERPL ELPH-MCNC: 4.64 G/DL (ref 3.2–5.1)
ALBUMIN SERPL ELPH-MCNC: 61 % (ref 48–70)
ALBUMIN UR ELPH-MCNC: 100 %
ALPHA1 GLOB MFR UR ELPH: 0 %
ALPHA1 GLOB SERPL ELPH-MCNC: 0.24 G/DL (ref 0.15–0.47)
ALPHA1 GLOB SERPL ELPH-MCNC: 3.1 % (ref 1.8–7)
ALPHA2 GLOB MFR UR ELPH: 0 %
ALPHA2 GLOB SERPL ELPH-MCNC: 0.6 G/DL (ref 0.42–1.04)
ALPHA2 GLOB SERPL ELPH-MCNC: 7.9 % (ref 5.9–14.9)
B-GLOBULIN MFR UR ELPH: 0 %
BETA GLOB ABNORMAL SERPL ELPH-MCNC: 0.46 G/DL (ref 0.31–0.57)
BETA1 GLOB SERPL ELPH-MCNC: 6.1 % (ref 4.7–7.7)
BETA2 GLOB SERPL ELPH-MCNC: 5.8 % (ref 3.1–7.9)
BETA2+GAMMA GLOB SERPL ELPH-MCNC: 0.44 G/DL (ref 0.2–0.58)
GAMMA GLOB ABNORMAL SERPL ELPH-MCNC: 1.22 G/DL (ref 0.4–1.66)
GAMMA GLOB MFR UR ELPH: 0 %
GAMMA GLOB SERPL ELPH-MCNC: 16.1 % (ref 6.9–22.3)
IGG/ALB SER: 1.56 {RATIO} (ref 1.1–1.8)
PROT PATTERN SERPL ELPH-IMP: NORMAL
PROT PATTERN UR ELPH-IMP: NORMAL
PROT SERPL-MCNC: 7.6 G/DL (ref 6.4–8.4)
PROT UR-MCNC: 5.8 MG/DL

## 2023-10-11 PROCEDURE — 84166 PROTEIN E-PHORESIS/URINE/CSF: CPT | Performed by: STUDENT IN AN ORGANIZED HEALTH CARE EDUCATION/TRAINING PROGRAM

## 2023-10-11 PROCEDURE — 84165 PROTEIN E-PHORESIS SERUM: CPT | Performed by: STUDENT IN AN ORGANIZED HEALTH CARE EDUCATION/TRAINING PROGRAM

## 2023-10-12 LAB
A-TOCOPHEROL VIT E SERPL-MCNC: 12.6 MG/L (ref 9–29)
GAMMA TOCOPHEROL SERPL-MCNC: 1.2 MG/L (ref 0.5–4.9)

## 2023-10-13 ENCOUNTER — TELEPHONE (OUTPATIENT)
Dept: NEUROLOGY | Facility: CLINIC | Age: 68
End: 2023-10-13

## 2023-10-13 LAB
VIT B1 BLD-SCNC: 52.6 NMOL/L (ref 66.5–200)
VIT B6 SERPL-MCNC: 10.9 UG/L (ref 3.4–65.2)

## 2023-10-13 NOTE — TELEPHONE ENCOUNTER
Called re: below using  services. [employer] [facility]  ID #: [id number]    Unable to leave a VM as B was full. Will try at another time.

## 2023-10-13 NOTE — TELEPHONE ENCOUNTER
----- Message from Griselda Arriaza MD sent at 10/13/2023  9:40 AM EDT -----  Please tell the patient to follow-up with family physician regarding low vitamin B1 and would need to be on replacement and send the blood work to the family physician

## 2023-11-15 ENCOUNTER — TELEPHONE (OUTPATIENT)
Age: 68
End: 2023-11-15

## 2023-11-15 NOTE — TELEPHONE ENCOUNTER
Caller: Rios Hoyos from [address] [place], [place] [address]y      Doctor: Dr. Mello Harrison     Reason for call: Rios Hoyos from [facility] calling asking if referral for PT can be faxed to number below. Requesting [facility]. Patient is there for appt now, need   Referral to see patient.       Geovani Hoyos  Fax: [phone]     Call back#: N/a

## 2023-11-15 NOTE — TELEPHONE ENCOUNTER
Caller: Brendon Carrillo from [address] [place] [address]     Doctor: Dr. Iveth Nuñez    Reason for call: Brendon Carrillo from [facility] calling asking if referral for PT can be faxed to number below. Patient is there for appt now.      Attn: Kirsten  Fax: [phone]    Call back#: [phone]

## 2023-11-15 NOTE — TELEPHONE ENCOUNTER
Caller: Patient/Kirsten [facility]    Doctor: Melania Olson    Reason for call: Requested new/updated referral be faxed to  PT. Advised referral faxed 11/15 was old referral from September 25th. That referral has already been used.  Fax# [phone]     Call back#: [phone]

## 2023-11-16 DIAGNOSIS — M48.062 SPINAL STENOSIS OF LUMBAR REGION WITH NEUROGENIC CLAUDICATION: Primary | ICD-10-CM

## 2023-11-16 DIAGNOSIS — Z98.1 HISTORY OF LUMBAR FUSION: ICD-10-CM

## 2023-11-16 DIAGNOSIS — G95.9 MYELOPATHY (HCC): ICD-10-CM

## 2023-11-22 ENCOUNTER — TELEPHONE (OUTPATIENT)
Dept: OBGYN CLINIC | Facility: CLINIC | Age: 68
End: 2023-11-22

## 2023-11-24 NOTE — TELEPHONE ENCOUNTER
Caller: Patient    Doctor: Maria Elena Holly    Reason for call: Patient rescheduled appt to 12/20 @ 1030am and would like LYFT canceled for 11/27 and rescheduled for 12/20    Call back#: [phone]

## 2023-12-19 ENCOUNTER — HOSPITAL ENCOUNTER (EMERGENCY)
Facility: HOSPITAL | Age: 68
Discharge: HOME | End: 2023-12-19
Attending: EMERGENCY MEDICINE
Payer: COMMERCIAL

## 2023-12-19 VITALS
RESPIRATION RATE: 18 BRPM | TEMPERATURE: 98.2 F | OXYGEN SATURATION: 98 % | HEART RATE: 56 BPM | SYSTOLIC BLOOD PRESSURE: 169 MMHG | DIASTOLIC BLOOD PRESSURE: 72 MMHG

## 2023-12-19 DIAGNOSIS — M19.049 ARTHRITIS OF HAND: ICD-10-CM

## 2023-12-19 DIAGNOSIS — M19.90 ARTHRITIS: Primary | ICD-10-CM

## 2023-12-19 PROCEDURE — 99284 EMERGENCY DEPT VISIT MOD MDM: CPT | Performed by: EMERGENCY MEDICINE

## 2023-12-19 PROCEDURE — 99282 EMERGENCY DEPT VISIT SF MDM: CPT

## 2023-12-19 RX ORDER — PREDNISONE 10 MG/1
TABLET ORAL DAILY
Qty: 45 TABLET | Refills: 0 | Status: SHIPPED | OUTPATIENT
Start: 2023-12-19 | End: 2024-01-03

## 2023-12-19 RX ORDER — HYDROCODONE BITARTRATE AND ACETAMINOPHEN 5; 325 MG/1; MG/1
1 TABLET ORAL EVERY 6 HOURS PRN
Qty: 18 TABLET | Refills: 0 | Status: SHIPPED | OUTPATIENT
Start: 2023-12-19 | End: 2023-12-19

## 2023-12-19 RX ORDER — HYDROCODONE BITARTRATE AND ACETAMINOPHEN 5; 325 MG/1; MG/1
1 TABLET ORAL EVERY 6 HOURS PRN
Qty: 18 TABLET | Refills: 0 | Status: SHIPPED | OUTPATIENT
Start: 2023-12-19 | End: 2023-12-29

## 2023-12-19 NOTE — ED PROVIDER NOTES
History  Chief Complaint   Patient presents with    Hand Pain     Patient reports right hand pain for the last 2 weeks, denies injury. Previously had injection to help with pain. Trouble bending fingers.      Armando Saini is a 68 y.o.  year old male  History reviewed. No pertinent past medical history.  Social History    Tobacco Use      Smoking status: Never      Smokeless tobacco: Never    Vaping Use      Vaping status: Never Used    Alcohol use: Never    Drug use: Never    Patient presents with:  Hand Pain: Patient reports right hand pain for the last 2 weeks, denies injury. Previously had injection to help with pain. Trouble bending fingers.   R hand and finger pain, with some swelling on the joints  Chronic, has worsen >1 week PTA  Worse in the AM, better throughout the day  Better with heat    History obtained directly from the PATIENT              History provided by:  Patient   used: No    Hand Pain  Associated symptoms: no abdominal pain, no chest pain, no cough, no ear pain, no fever, no rash, no shortness of breath, no sore throat and no vomiting        Cannot display prior to admission medications because the patient has not been admitted in this contact.       History reviewed. No pertinent past medical history.    Past Surgical History:   Procedure Laterality Date    BACK SURGERY      SPINE SURGERY      herniated discs       History reviewed. No pertinent family history.  I have reviewed and agree with the history as documented.    E-Cigarette/Vaping    E-Cigarette Use Never User      E-Cigarette/Vaping Substances     Social History     Tobacco Use    Smoking status: Never    Smokeless tobacco: Never   Vaping Use    Vaping status: Never Used   Substance Use Topics    Alcohol use: Never    Drug use: Never       Review of Systems   Constitutional:  Negative for chills and fever.   HENT:  Negative for ear pain and sore throat.    Eyes:  Negative for pain and visual disturbance.    Respiratory:  Negative for cough and shortness of breath.    Cardiovascular:  Negative for chest pain and palpitations.   Gastrointestinal:  Negative for abdominal pain and vomiting.   Genitourinary:  Negative for dysuria and hematuria.   Musculoskeletal:  Positive for arthralgias and joint swelling. Negative for back pain.   Skin:  Negative for color change and rash.   Neurological:  Negative for seizures and syncope.   All other systems reviewed and are negative.      Physical Exam  Physical Exam  Vitals reviewed.   Constitutional:       General: He is not in acute distress.     Appearance: Normal appearance. He is not ill-appearing.   HENT:      Head: Atraumatic.      Right Ear: External ear normal.      Left Ear: External ear normal.      Mouth/Throat:      Mouth: Mucous membranes are moist.   Eyes:      Pupils: Pupils are equal, round, and reactive to light.   Pulmonary:      Effort: Pulmonary effort is normal.   Musculoskeletal:      Comments: Tenderness and swelling of fingers and joints of the fingers and wrist  No discoloration, no warmth     Skin:     General: Skin is warm and dry.   Neurological:      General: No focal deficit present.      Mental Status: He is alert and oriented to person, place, and time. Mental status is at baseline.   Psychiatric:         Mood and Affect: Mood normal.         Thought Content: Thought content normal.         Vital Signs  ED Triage Vitals [12/19/23 1342]   Temperature Pulse Respirations Blood Pressure SpO2   98.2 °F (36.8 °C) 56 18 169/72 98 %      Temp src Heart Rate Source Patient Position - Orthostatic VS BP Location FiO2 (%)   -- -- Sitting Left arm --      Pain Score       --           Vitals:    12/19/23 1342   BP: 169/72   Pulse: 56   Patient Position - Orthostatic VS: Sitting         Visual Acuity      ED Medications  Medications - No data to display    Diagnostic Studies  Results Reviewed       None                   No orders to display               Procedures  Procedures         ED Course                                             Medical Decision Making  Patient clinical presentation is benign.    Meaning patient's vital signs are normal and stable ED Triage Vitals [12/19/23 1342]  Temperature: 98.2 °F (36.8 °C)  Pulse: 56  Respirations: 18  Blood Pressure: 169/72  SpO2: 98 %  Temp src: n/a  Heart Rate Source: n/a  Patient Position - Orthostatic VS: Sitting  BP Location: Left arm  FiO2 (%): n/a  Pain Score: n/a.    Patient in no distress.    Chief complaint, vital signs, physical examination does not suggest an acute medical emergency at this time.                Disposition  Final diagnoses:   Arthritis   Arthritis of hand     Time reflects when diagnosis was documented in both MDM as applicable and the Disposition within this note       Time User Action Codes Description Comment    12/19/2023  2:07 PM Aly Benjamin [M19.90] Arthritis     12/19/2023  2:07 PM Ayl Benjamin [M19.049] Arthritis of hand           ED Disposition       ED Disposition   Discharge    Condition   Stable    Date/Time   Tue Dec 19, 2023  2:07 PM    Comment   Armando Saini discharge to home/self care.                   Follow-up Information    None         Patient's Medications   Discharge Prescriptions    HYDROCODONE-ACETAMINOPHEN (NORCO) 5-325 MG PER TABLET    Take 1 tablet by mouth every 6 (six) hours as needed for pain for up to 10 days Max Daily Amount: 4 tablets       Start Date: 12/19/2023End Date: 12/29/2023       Order Dose: 1 tablet       Quantity: 18 tablet    Refills: 0    PREDNISONE 10 MG TABLET    Take 5 tablets (50 mg total) by mouth daily for 3 days, THEN 4 tablets (40 mg total) daily for 3 days, THEN 3 tablets (30 mg total) daily for 3 days, THEN 2 tablets (20 mg total) daily for 3 days, THEN 1 tablet (10 mg total) daily for 3 days.       Start Date: 12/19/2023End Date: 1/3/2024       Order Dose: --       Quantity: 45 tablet    Refills: 0       No  discharge procedures on file.    PDMP Review         Value Time User    PDMP Reviewed  Yes 7/19/2023  8:48 AM Anupam Prescott MD            ED Provider  Electronically Signed by             Aly Benjamin MD  12/19/23 0791

## 2023-12-19 NOTE — DISCHARGE INSTRUCTIONS
A  personal message from Dr. Aly Benjamin,  Thank you so much for allowing me to care for you today.    I pride myself in the care and attention I give all my patients.  I hope you were a witness to this tonight.   If for any reason your condition does not improve or worsens, or you have a question that was not answered during your visit you can feel free to text me on my personal phone #  # [phone].   I will answer to your message and continue your care past your emergency room visit.     Please understand that although you are being discharged because your condition has been deemed stable and able to be managed on an outpatient setting. However your condition may worsen as part of the natural progression of the illness/condition, if this occurs please come back to the emergency department for a repeat evaluation.

## 2023-12-19 NOTE — Clinical Note
Armando Saini was seen and treated in our emergency department on 12/19/2023.                Diagnosis: arthritis acute    Armando  is off the rest of the shift today.    He may return on this date:          If you have any questions or concerns, please don't hesitate to call.      Aly Benjamin MD    ______________________________           _______________          _______________  Hospital Representative                              Date                                Time

## 2023-12-20 ENCOUNTER — OFFICE VISIT (OUTPATIENT)
Dept: OBGYN CLINIC | Facility: CLINIC | Age: 68
End: 2023-12-20
Payer: COMMERCIAL

## 2023-12-20 VITALS
DIASTOLIC BLOOD PRESSURE: 70 MMHG | HEART RATE: 54 BPM | SYSTOLIC BLOOD PRESSURE: 114 MMHG | BODY MASS INDEX: 29.57 KG/M2 | WEIGHT: 184 LBS | HEIGHT: 66 IN

## 2023-12-20 DIAGNOSIS — G95.9 MYELOPATHY (HCC): ICD-10-CM

## 2023-12-20 DIAGNOSIS — Z98.1 HISTORY OF LUMBAR FUSION: ICD-10-CM

## 2023-12-20 DIAGNOSIS — M48.062 SPINAL STENOSIS OF LUMBAR REGION WITH NEUROGENIC CLAUDICATION: Primary | ICD-10-CM

## 2023-12-20 PROCEDURE — 99213 OFFICE O/P EST LOW 20 MIN: CPT | Performed by: ORTHOPAEDIC SURGERY

## 2023-12-20 RX ORDER — LANOLIN ALCOHOL/MO/W.PET/CERES
100 CREAM (GRAM) TOPICAL DAILY
COMMUNITY
Start: 2023-10-27 | End: 2024-10-26

## 2023-12-20 RX ORDER — IBUPROFEN 600 MG/1
600 TABLET ORAL EVERY 8 HOURS PRN
COMMUNITY
Start: 2023-10-27

## 2023-12-20 NOTE — PROGRESS NOTES
[facility]  DR.AMIR REED MD  [address] [facility] [address] [phone]    HISTORY OF PRESENT ILLNESS:    Armando Saini is a 68 y.o. 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 The patient was last seen in the office on 9/25/2023 where patient was advised that he is a surgical candidate when he decides to pursue surgery and updated aqua therapy script was provided. Patient states he is feeling slightly better than last visit. Patient states he is doing the aqua therapy exercises. Patient states that he walks for about 6 hours while at work. Patient mentions he has numbness that goes down his legs.       ALLERGIES: No Known Allergies    MEDICATIONS:    Current Outpatient Medications:     acetaminophen (TYLENOL) 325 mg tablet, Take 650 mg by mouth every 6 (six) hours as needed (Patient not taking: Reported on 10/9/2023), Disp: , Rfl:     capsicum (ZOSTRIX) 0.075 % topical cream, Apply topically 3 (three) times a day (Patient not taking: Reported on 10/9/2023), Disp: 28.3 g, Rfl: 0    cyclobenzaprine (FLEXERIL) 5 mg tablet, Take 1 tablet (5 mg total) by mouth daily at bedtime (Patient not taking: Reported on 10/9/2023), Disp: 10 tablet, Rfl: 0    Diclofenac Sodium (VOLTAREN) 1 %, , Disp: , Rfl:     HYDROcodone-acetaminophen (Norco) 5-325 mg per tablet, Take 1 tablet by mouth every 6 (six) hours as needed for pain for up to 10 days Max Daily Amount: 4 tablets, Disp: 18 tablet, Rfl: 0    lidocaine (Lidoderm) 5 %, Apply 1 patch topically over 12 hours daily Remove & Discard patch within 12 hours or as directed by MD (Patient not taking: Reported on 10/9/2023), Disp: 6 patch, Rfl: 0    predniSONE 10 mg tablet, Take 5 tablets (50 mg total) by mouth daily for 3 days, THEN 4 tablets (40 mg total) daily for 3 days, THEN 3 tablets (30 mg total) daily  for 3 days, THEN 2 tablets (20 mg total) daily for 3 days, THEN 1 tablet (10 mg total) daily for 3 days., Disp: 45 tablet, Rfl: 0    pregabalin (LYRICA) 75 mg capsule, Take 1 capsule (75 mg total) by mouth 2 (two) times a day Start by taking 1 capsule at bedtime for 2 days. Then increase to 1 capsule in the evening and 1 capsule at bedtime. (Patient not taking: Reported on 10/9/2023), Disp: 60 capsule, Rfl: 0     PAST MEDICAL HISTORY:   No past medical history on file.    PAST SURGICAL HISTORY:  Past Surgical History:   Procedure Laterality Date    BACK SURGERY      SPINE SURGERY      herniated discs       SOCIAL HISTORY:  Social History     Tobacco Use   Smoking Status Never   Smokeless Tobacco Never          PHYSICAL EXAM:  68 y.o. male sitting comfortably on exam chair in no apparent distress.   Ambulates leaning forward with normal gait  Able to walk on toes and heels       RADIOGRAPHIC STUDIES:  1.  MRI, Lumbar spine, 12/29/22: Status post L3-4 posterior spinal fusion and central laminectomy.  There is evidence of persistent spinal stenosis.  There is also evidence of severe spinal stenosis at L4-5, L5-S1.  Severe stenosis is also present at L1-2 and L2-3.  Abnormal signal is present on the spinal cord in the lower thoracic spine.  Axial images and at L1 and the thoracolumbar spine cannot be fully visualized.     2. X-rays, Lumbar spine, 11/16/2021: Multilevel lumbar degenerative disc and facet arthrosis.  Lumbarization of the S1 vertebral body with bilateral Bertolotti articulation.  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  Degenerative spondylolisthesis L5-S1.     3. X-rays, lumbar spine, 3/1/2023: Multilevel lumbar degenerative disc and facet arthrosis.  There is evidence of posterior laminectomy at L3-4 in addition to posterior instrumentation with very long rods.  There is no indication of instrumentation or decompression at L4-5.  There is progression of spondylolisthesis compared to prior films from 2021.     4. CT,  lumbar spine, 7/12/23: Moderate to severe multilevel lumbar degenerative disc disease.  There is evidence of posterior spinal fusion at L3-4.  The decompression has been performed centrally.  The facets are primarily intact and there is evidence of lateral recess stenosis present.  Severe stenosis at L4-5 and to lesser extent at other levels of the lumbar spine.        5. MRI, Thoracic spine, 9/11/23: Multilevel thoracic degenerative disc disease.  No evidence of spinal cord compression or flattening.  No some myelomalacia.  There is 2 areas where the canal is slightly stenotic but this is not clinically significant.     6. MRI, Lumbar spine, 9/18/23: Transitional anatomy at the lumbosacral junction.  There is a unilateral Bertolotti at L5-S1.  L5-S1 disc is normal.  There is however evidence of facet arthrosis at L5-S1 resulting in mild to moderate lateral recess stenosis.  At L4-5 there is evidence of degenerative spondylosis with severe central and lateral recess stenosis.  At L3-4 there is evidence of prior central laminectomy.  Persistent moderate lateral recess stenosis is present.  There is evidence of instrumentation at that level.  At L2-3 there is evidence of moderate lateral recess stenosis.  And at L1-2 there is evidence of mild to moderate lateral recess stenosis.      ASSESSMENT:  1. Spinal stenosis of lumbar region with neurogenic claudication    2. History of lumbar fusion    3. Myelopathy (HCC)        PLAN:  68 y.o. male with lumbar spinal stenosis, history of lumbar fusion, lumbar spondylolisthesis.      Patient is doing well at this time and is functioning well since doing aqua therapy. Patient was advised to continue doing the exercises and when he is no longer able to walk at work, then he should consider surgical intervention.     Patient will follow-up in 3 months for re-evaluation and for myelopathy check.      Scribe Attestation      I,:  Kaila Bernard PA-C am acting as a scribe while  in the presence of the attending physician.:       I,:  Erica Fam MD personally performed the services described in this documentation    as scribed in my presence.:

## 2024-01-02 ENCOUNTER — PROCEDURE VISIT (OUTPATIENT)
Dept: NEUROLOGY | Facility: CLINIC | Age: 69
End: 2024-01-02
Payer: COMMERCIAL

## 2024-01-02 DIAGNOSIS — G62.9 NEUROPATHY: ICD-10-CM

## 2024-01-02 PROCEDURE — 95886 MUSC TEST DONE W/N TEST COMP: CPT | Performed by: PHYSICAL MEDICINE & REHABILITATION

## 2024-01-02 PROCEDURE — 95911 NRV CNDJ TEST 9-10 STUDIES: CPT | Performed by: PHYSICAL MEDICINE & REHABILITATION

## 2024-01-08 ENCOUNTER — HOSPITAL ENCOUNTER (EMERGENCY)
Facility: HOSPITAL | Age: 69
Discharge: HOME | End: 2024-01-08
Attending: EMERGENCY MEDICINE
Payer: COMMERCIAL

## 2024-01-08 VITALS
DIASTOLIC BLOOD PRESSURE: 74 MMHG | RESPIRATION RATE: 18 BRPM | TEMPERATURE: 98.2 F | OXYGEN SATURATION: 97 % | SYSTOLIC BLOOD PRESSURE: 141 MMHG | HEART RATE: 82 BPM

## 2024-01-08 DIAGNOSIS — U07.1 COVID: Primary | ICD-10-CM

## 2024-01-08 LAB
FLUAV RNA RESP QL NAA+PROBE: NEGATIVE
FLUBV RNA RESP QL NAA+PROBE: NEGATIVE
RSV RNA RESP QL NAA+PROBE: NEGATIVE
SARS-COV-2 RNA RESP QL NAA+PROBE: POSITIVE

## 2024-01-08 PROCEDURE — 99284 EMERGENCY DEPT VISIT MOD MDM: CPT | Performed by: EMERGENCY MEDICINE

## 2024-01-08 PROCEDURE — 0241U HB NFCT DS VIR RESP RNA 4 TRGT: CPT | Performed by: EMERGENCY MEDICINE

## 2024-01-08 PROCEDURE — 99283 EMERGENCY DEPT VISIT LOW MDM: CPT

## 2024-01-08 RX ORDER — NIRMATRELVIR AND RITONAVIR 300-100 MG
3 KIT ORAL 2 TIMES DAILY
Qty: 30 TABLET | Refills: 0 | Status: SHIPPED | OUTPATIENT
Start: 2024-01-08 | End: 2024-01-13

## 2024-01-08 RX ORDER — BENZONATATE 100 MG/1
200 CAPSULE ORAL EVERY 8 HOURS
Qty: 21 CAPSULE | Refills: 0 | Status: SHIPPED | OUTPATIENT
Start: 2024-01-08

## 2024-01-08 RX ORDER — BENZONATATE 100 MG/1
200 CAPSULE ORAL ONCE
Status: COMPLETED | OUTPATIENT
Start: 2024-01-08 | End: 2024-01-08

## 2024-01-08 RX ORDER — IBUPROFEN 800 MG/1
800 TABLET ORAL 3 TIMES DAILY
Qty: 21 TABLET | Refills: 0 | Status: SHIPPED | OUTPATIENT
Start: 2024-01-08

## 2024-01-08 RX ADMIN — PREDNISONE 50 MG: 20 TABLET ORAL at 09:22

## 2024-01-08 RX ADMIN — BENZONATATE 200 MG: 100 CAPSULE ORAL at 09:22

## 2024-01-08 NOTE — Clinical Note
Armando Saini was seen and treated in our emergency department on 1/8/2024.                Diagnosis: COVID    Armando  may return to work on return date.    He may return on this date: 01/13/2024         If you have any questions or concerns, please don't hesitate to call.      Aly Benjamin MD    ______________________________           _______________          _______________  Hospital Representative                              Date                                Time

## 2024-01-08 NOTE — ED PROVIDER NOTES
History  Chief Complaint   Patient presents with    Flu Symptoms     Symptoms since Saturday.      Armando Saini is a 68 y.o.  year old male  History reviewed. No pertinent past medical history.  Social History    Tobacco Use      Smoking status: Never      Smokeless tobacco: Never    Vaping Use      Vaping status: Never Used    Alcohol use: Never    Drug use: Never    Patient presents with:  Flu Symptoms: Symptoms since Saturday.   Symptoms of congestion, sore throat, cough, body aches.  No nausea no vomiting no diarrhea.  No sick contacts at home.    History obtained directly from the PATIENT              History provided by:  Patient   used: No    Flu Symptoms  Presenting symptoms: cough, fatigue, headache, myalgias and sore throat    Presenting symptoms: no diarrhea, no fever, no nausea, no shortness of breath and no vomiting    Associated symptoms: no chills and no ear pain        Prior to Admission Medications   Prescriptions Last Dose Informant Patient Reported? Taking?   Diclofenac Sodium (VOLTAREN) 1 %  Self Yes No   Patient not taking: Reported on 10/9/2023   acetaminophen (TYLENOL) 325 mg tablet  Self Yes No   Sig: Take 650 mg by mouth every 6 (six) hours as needed   capsicum (ZOSTRIX) 0.075 % topical cream  Self No No   Sig: Apply topically 3 (three) times a day   Patient not taking: Reported on 10/9/2023   cyclobenzaprine (FLEXERIL) 5 mg tablet  Self No No   Sig: Take 1 tablet (5 mg total) by mouth daily at bedtime   ibuprofen (MOTRIN) 600 mg tablet  Self Yes No   Sig: Take 600 mg by mouth every 8 (eight) hours as needed   lidocaine (Lidoderm) 5 %  Self No No   Sig: Apply 1 patch topically over 12 hours daily Remove & Discard patch within 12 hours or as directed by MD   Patient not taking: Reported on 10/9/2023   pregabalin (LYRICA) 75 mg capsule  Self No No   Sig: Take 1 capsule (75 mg total) by mouth 2 (two) times a day Start by taking 1 capsule at bedtime for 2 days. Then  increase to 1 capsule in the evening and 1 capsule at bedtime.   Patient not taking: Reported on 10/9/2023   thiamine 100 MG tablet  Self Yes No   Sig: Take 100 mg by mouth daily   Patient not taking: Reported on 12/20/2023      Facility-Administered Medications: None       History reviewed. No pertinent past medical history.    Past Surgical History:   Procedure Laterality Date    BACK SURGERY      SPINE SURGERY      herniated discs       History reviewed. No pertinent family history.  I have reviewed and agree with the history as documented.    E-Cigarette/Vaping    E-Cigarette Use Never User      E-Cigarette/Vaping Substances     Social History     Tobacco Use    Smoking status: Never    Smokeless tobacco: Never   Vaping Use    Vaping status: Never Used   Substance Use Topics    Alcohol use: Never    Drug use: Never       Review of Systems   Constitutional:  Positive for fatigue. Negative for chills and fever.   HENT:  Positive for sore throat. Negative for ear pain.    Eyes:  Negative for pain and visual disturbance.   Respiratory:  Positive for cough. Negative for shortness of breath.    Cardiovascular:  Negative for chest pain and palpitations.   Gastrointestinal:  Negative for abdominal pain, diarrhea, nausea and vomiting.   Genitourinary:  Negative for dysuria and hematuria.   Musculoskeletal:  Positive for myalgias. Negative for arthralgias and back pain.   Skin:  Negative for color change and rash.   Neurological:  Positive for headaches. Negative for seizures and syncope.   All other systems reviewed and are negative.      Physical Exam  Physical Exam  Vitals reviewed.   Constitutional:       General: He is not in acute distress.     Appearance: Normal appearance. He is normal weight. He is not ill-appearing.   HENT:      Head: Atraumatic.      Right Ear: Tympanic membrane, ear canal and external ear normal.      Left Ear: Tympanic membrane, ear canal and external ear normal.      Nose: Nose normal.       Mouth/Throat:      Mouth: Mucous membranes are moist.   Eyes:      Conjunctiva/sclera: Conjunctivae normal.      Pupils: Pupils are equal, round, and reactive to light.   Cardiovascular:      Rate and Rhythm: Normal rate.      Pulses: Normal pulses.      Heart sounds: Normal heart sounds.   Pulmonary:      Effort: Pulmonary effort is normal.   Skin:     General: Skin is warm and dry.      Capillary Refill: Capillary refill takes less than 2 seconds.   Neurological:      General: No focal deficit present.      Mental Status: He is alert and oriented to person, place, and time.      Cranial Nerves: No cranial nerve deficit.      Sensory: No sensory deficit.   Psychiatric:         Mood and Affect: Mood normal.         Thought Content: Thought content normal.         Vital Signs  ED Triage Vitals [01/08/24 0809]   Temperature Pulse Respirations Blood Pressure SpO2   98.2 °F (36.8 °C) 82 18 141/74 97 %      Temp Source Heart Rate Source Patient Position - Orthostatic VS BP Location FiO2 (%)   Oral Monitor Sitting Left arm --      Pain Score       5           Vitals:    01/08/24 0809   BP: 141/74   Pulse: 82   Patient Position - Orthostatic VS: Sitting         Visual Acuity      ED Medications  Medications   benzonatate (TESSALON PERLES) capsule 200 mg (200 mg Oral Given 1/8/24 0922)   predniSONE tablet 50 mg (50 mg Oral Given 1/8/24 0922)       Diagnostic Studies  Results Reviewed       Procedure Component Value Units Date/Time    FLU/RSV/COVID - if FLU/RSV clinically relevant [[id number]]  (Abnormal) Collected: 01/08/24 0813    Lab Status: Final result Specimen: Nares from Nose Updated: 01/08/24 1009     SARS-CoV-2 Positive     INFLUENZA A PCR Negative     INFLUENZA B PCR Negative     RSV PCR Negative    Narrative:      FOR PEDIATRIC PATIENTS - copy/paste COVID Guidelines URL to browser: [email or web address]    SARS-CoV-2 assay is a Nucleic Acid Amplification assay  intended for the  qualitative detection of nucleic acid from SARS-CoV-2 in nasopharyngeal  swabs. Results are for the presumptive identification of SARS-CoV-2 RNA.    Positive results are indicative of infection with SARS-CoV-2, the virus  causing COVID-19, but do not rule out bacterial infection or co-infection  with other viruses. Laboratories within the [place] and its  territories are required to report all positive results to the appropriate  public health authorities. Negative results do not preclude SARS-CoV-2  infection and should not be used as the sole basis for treatment or other  patient management decisions. Negative results must be combined with  clinical observations, patient history, and epidemiological information.  This test has not been FDA cleared or approved.    This test has been authorized by FDA under an Emergency Use Authorization  (EUA). This test is only authorized for the duration of time the  declaration that circumstances exist justifying the authorization of the  emergency use of an in vitro diagnostic tests for detection of SARS-CoV-2  virus and/or diagnosis of COVID-19 infection under section 564(b)(1) of  the Act, 21 U.S.C. 360bbb-3(b)(1), unless the authorization is terminated  or revoked sooner. The test has been validated but independent review by FDA  and CLIA is pending.    Test performed using [employer]: This RT-PCR assay targets N2,  a region unique to SARS-CoV-2. A conserved region in the E-gene was chosen  for pan-Sarbecovirus detection which includes SARS-CoV-2.    According to CMS-2020-01-R, this platform meets the definition of high-throughput technology.                   No orders to display              Procedures  Procedures         ED Course                                             Medical Decision Making  Patient with fever, ddx includes but not limited to: viral syndrome (flu, covid, RSV), URI, bronchitis, PNA.    Patient clinical presentation is  benign.    Meaning patient's vital signs are normal and stable ED Triage Vitals [01/08/24 0809]  Temperature: 98.2 °F (36.8 °C)  Pulse: 82  Respirations: 18  Blood Pressure: 141/74  SpO2: 97 %  Temp Source: Oral  Heart Rate Source: Monitor  Patient Position - Orthostatic VS: Sitting  BP Location: Left arm  FiO2 (%): n/a  Pain Score: 5.    Patient in no distress.    Chief complaint, vital signs, physical examination does not suggest an acute medical emergency at this time.       Problems Addressed:  COVID: acute illness or injury    Risk  Prescription drug management.             Disposition  Final diagnoses:   COVID     Time reflects when diagnosis was documented in both MDM as applicable and the Disposition within this note       Time User Action Codes Description Comment    1/8/2024 10:12 AM Aly Benjamin [U07.1] COVID           ED Disposition       ED Disposition   Discharge    Condition   Stable    Date/Time   Mon Jan 8, 2024 10:12 AM    Comment   Armando Saini discharge to home/self care.                   Follow-up Information    None         Discharge Medication List as of 1/8/2024 10:13 AM        START taking these medications    Details   benzonatate (TESSALON PERLES) 100 mg capsule Take 2 capsules (200 mg total) by mouth every 8 (eight) hours, Starting Mon 1/8/2024, Normal      !! ibuprofen (MOTRIN) 800 mg tablet Take 1 tablet (800 mg total) by mouth 3 (three) times a day, Starting Mon 1/8/2024, Normal      nirmatrelvir & ritonavir (Paxlovid, 300/100,) tablet therapy pack Take 3 tablets by mouth 2 (two) times a day for 5 days Take 2 nirmatrelvir tablets + 1 ritonavir tablet together per dose, Starting Mon 1/8/2024, Until Sat 1/13/2024, Normal       !! - Potential duplicate medications found. Please discuss with provider.        CONTINUE these medications which have NOT CHANGED    Details   acetaminophen (TYLENOL) 325 mg tablet Take 650 mg by mouth every 6 (six) hours as needed, Historical Med       capsicum (ZOSTRIX) 0.075 % topical cream Apply topically 3 (three) times a day, Starting Wed 7/19/2023, Normal      cyclobenzaprine (FLEXERIL) 5 mg tablet Take 1 tablet (5 mg total) by mouth daily at bedtime, Starting Tue 10/25/2022, Normal      Diclofenac Sodium (VOLTAREN) 1 % Starting Sat 7/15/2023, Historical Med      !! ibuprofen (MOTRIN) 600 mg tablet Take 600 mg by mouth every 8 (eight) hours as needed, Starting Fri 10/27/2023, Historical Med      lidocaine (Lidoderm) 5 % Apply 1 patch topically over 12 hours daily Remove & Discard patch within 12 hours or as directed by MD, Starting Wed 7/12/2023, Normal      pregabalin (LYRICA) 75 mg capsule Take 1 capsule (75 mg total) by mouth 2 (two) times a day Start by taking 1 capsule at bedtime for 2 days. Then increase to 1 capsule in the evening and 1 capsule at bedtime., Starting Wed 7/19/2023, Normal      thiamine 100 MG tablet Take 100 mg by mouth daily, Starting Fri 10/27/2023, Until Sat 10/26/2024, Historical Med       !! - Potential duplicate medications found. Please discuss with provider.          No discharge procedures on file.    PDMP Review         Value Time User    PDMP Reviewed  Yes 7/19/2023  8:48 AM Anupam Prescott MD            ED Provider  Electronically Signed by             Aly Benjamin MD  01/08/24 0283

## 2024-01-08 NOTE — DISCHARGE INSTRUCTIONS
Call [employer] at  [phone] [facility] ([phone]) to obtain a primary care physician.  They will be able to schedule you with a physician who sees patients with your insurance and physicians who see patients without insurance.    A  personal message from Dr. Aly Benjamin,  Thank you so much for allowing me to care for you today.    I pride myself in the care and attention I give all my patients.  I hope you were a witness to this tonight.   If for any reason your condition does not improve or worsens, or you have a question that was not answered during your visit you can feel free to text me on my personal phone #  # [phone].   I will answer to your message and continue your care past your emergency room visit.     Please understand that although you are being discharged because your condition has been deemed stable and able to be managed on an outpatient setting. However your condition may worsen as part of the natural progression of the illness/condition, if this occurs please come back to the emergency department for a repeat evaluation.

## 2024-01-17 ENCOUNTER — HOSPITAL ENCOUNTER (OUTPATIENT)
Dept: NON INVASIVE DIAGNOSTICS | Facility: CLINIC | Age: 69
Discharge: HOME | End: 2024-01-17
Payer: COMMERCIAL

## 2024-01-17 DIAGNOSIS — I73.9 PVD (PERIPHERAL VASCULAR DISEASE) (HCC): ICD-10-CM

## 2024-01-17 PROCEDURE — 93925 LOWER EXTREMITY STUDY: CPT

## 2024-01-17 PROCEDURE — 93925 LOWER EXTREMITY STUDY: CPT | Performed by: SURGERY

## 2024-01-17 PROCEDURE — 93922 UPR/L XTREMITY ART 2 LEVELS: CPT | Performed by: SURGERY

## 2024-01-17 PROCEDURE — 93923 UPR/LXTR ART STDY 3+ LVLS: CPT

## 2024-01-18 ENCOUNTER — TELEPHONE (OUTPATIENT)
Dept: NEUROLOGY | Facility: CLINIC | Age: 69
End: 2024-01-18

## 2024-02-23 ENCOUNTER — TELEPHONE (OUTPATIENT)
Dept: NEUROLOGY | Facility: CLINIC | Age: 69
End: 2024-02-23

## 2024-02-23 NOTE — TELEPHONE ENCOUNTER
Vera, patients sister called in to schedule follow.    Patient was scheduled and AddOn to Dr Lincoln on 2/27/24 at 8:30am.

## 2024-02-27 ENCOUNTER — OFFICE VISIT (OUTPATIENT)
Dept: NEUROLOGY | Facility: CLINIC | Age: 69
End: 2024-02-27
Payer: COMMERCIAL

## 2024-02-27 VITALS
HEART RATE: 52 BPM | SYSTOLIC BLOOD PRESSURE: 112 MMHG | OXYGEN SATURATION: 96 % | BODY MASS INDEX: 29.7 KG/M2 | HEIGHT: 66 IN | DIASTOLIC BLOOD PRESSURE: 64 MMHG

## 2024-02-27 DIAGNOSIS — M48.062 SPINAL STENOSIS OF LUMBAR REGION WITH NEUROGENIC CLAUDICATION: ICD-10-CM

## 2024-02-27 DIAGNOSIS — R20.2 NUMBNESS AND TINGLING: Primary | ICD-10-CM

## 2024-02-27 DIAGNOSIS — R20.0 NUMBNESS AND TINGLING: Primary | ICD-10-CM

## 2024-02-27 PROCEDURE — 99214 OFFICE O/P EST MOD 30 MIN: CPT | Performed by: PSYCHIATRY & NEUROLOGY

## 2024-02-27 NOTE — PROGRESS NOTES
Armando Saini is a 68 y.o. male.   Chief Complaint   Patient presents with    Neuropathy    spinal stenosis       Assessment:  1. Numbness and tingling    2. Spinal stenosis of lumbar region with neurogenic claudication         Plan:  Patient's arterial Doppler results from 1/17/2024 were reviewed, there was no evidence of significant lower extremity arterial occlusive disease as per the report, EMG results from 1/2/2024 were reviewed with the patient it shows chronic L4-L5 radiculopathy without any evidence of any peripheral neuropathy, his blood work from 10/9/2023 was reviewed with him his most TSH vitamin D and vitamin B6 were normal, vitamin B1 was low at 52.6 patient was supposed to be on thiamine 100 mg replacement he is not taking that have advised him to take that and also follow-up with the family physician.    He has seen an orthopedic spine surgeon and has had an MRI of the lumbar spine from 9/18/2023 that shows developmental spinal canal stenosis within the lumbar canal from L1-L5 patient has undergone previous posterior decompression at the L3-L4 level there is stable moderate canal stenosis at this level and at L4-L5 there is severe tricompartmental canal stenosis patient was advised to go for aqua therapy by the surgeon and then if he continues to have difficulty with his walking then to consider surgery,    Patient has tried gabapentin and Lyrica in the past and does not want to be on that I advised him to follow-up with his pain specialist he does have clinical signs of some neuropathy he may want to discuss with him about some topical creams also he will take his vitamin B1 continue with his home exercise program continue follow-up with his orthopedic surgeon, to keep his blood pressure cholesterol sugar under control to take fall and safety precautions to go to the hospital if has any worsening symptoms and call me otherwise to see me back in 6 months or sooner if needed and follow-up with  "the other physicians.        Subjective:    HPI   Patient is here in follow-up for his lumbar spinal stenosis with neurogenic claudication and numbness and tingling in his feet up to his ankles, he is accompanied with his sister who is interpreting for him, according to have the patient has tried gabapentin and Lyrica in the past without much relief he does have numbness and tingling in his feet up to his ankles mostly when he is standing or walking gets relieved when he is lying down he also has some pain in the lower back when he is walking he is not walking much he denies having any bowel and bladder incontinence he has had lumbar surgery he had 2 years back, he has done physical therapy and aqua therapy he has not had any falls he does ambulate with a cane denies any history of alcohol use he is not a diabetic denies any smoking, appetite is good weight has been stable no other complaints.    Vitals:    02/27/24 0841   BP: 112/64   BP Location: Left arm   Patient Position: Sitting   Cuff Size: Large   Pulse: (!) 52   SpO2: 96%   Height: 5' 6\" (1.676 m)       Current Medications    Current Outpatient Medications:     acetaminophen (TYLENOL) 325 mg tablet, Take 650 mg by mouth every 6 (six) hours as needed, Disp: , Rfl:     benzonatate (TESSALON PERLES) 100 mg capsule, Take 2 capsules (200 mg total) by mouth every 8 (eight) hours, Disp: 21 capsule, Rfl: 0    ibuprofen (MOTRIN) 600 mg tablet, Take 600 mg by mouth every 8 (eight) hours as needed, Disp: , Rfl:     ibuprofen (MOTRIN) 800 mg tablet, Take 1 tablet (800 mg total) by mouth 3 (three) times a day, Disp: 21 tablet, Rfl: 0    capsicum (ZOSTRIX) 0.075 % topical cream, Apply topically 3 (three) times a day, Disp: 28.3 g, Rfl: 0    cyclobenzaprine (FLEXERIL) 5 mg tablet, Take 1 tablet (5 mg total) by mouth daily at bedtime, Disp: 10 tablet, Rfl: 0    Diclofenac Sodium (VOLTAREN) 1 %, , Disp: , Rfl:     lidocaine (Lidoderm) 5 %, Apply 1 patch topically over 12 " hours daily Remove & Discard patch within 12 hours or as directed by MD, Disp: 6 patch, Rfl: 0    pregabalin (LYRICA) 75 mg capsule, Take 1 capsule (75 mg total) by mouth 2 (two) times a day Start by taking 1 capsule at bedtime for 2 days. Then increase to 1 capsule in the evening and 1 capsule at bedtime., Disp: 60 capsule, Rfl: 0    thiamine 100 MG tablet, Take 100 mg by mouth daily, Disp: , Rfl:       Allergies  Patient has no known allergies.    Past Medical History  History reviewed. No pertinent past medical history.      Past Surgical History:  Past Surgical History:   Procedure Laterality Date    BACK SURGERY      SPINE SURGERY      herniated discs         Family History:  History reviewed. No pertinent family history.    Social History:   reports that he has never smoked. He has never used smokeless tobacco. He reports that he does not drink alcohol and does not use drugs.    I have reviewed the past medical history, surgical history, social and family history, current medications, allergies vitals, review of systems, and updated this information as appropriate today.   Objective:    Physical Exam    Neurological Exam     GENERAL:  Cooperative in no acute distress. Well-developed and well-nourished     HEAD and NECK   Head is atraumatic normocephalic with no lesions or masses. Neck is supple with full range of motion     CARDIOVASCULAR  Carotid Arteries-no carotid bruits.     NEUROLOGIC:  Mental Status-the patient is awake alert and oriented without aphasia or apraxia  Cranial Nerves: Visual fields are full to confrontation.  Extraocular movements are full without nystagmus. Pupils are 2-1/2 mm and reactive. Face is symmetrical to light touch. Movements of facial expression move symmetrically. Hearing is normal to finger rub bilaterally. Soft palate lifts symmetrically. Shoulder shrug is symmetrical. Tongue is midline without atrophy.  Motor: No drift is noted on arm extension. Strength is full in the upper  and lower extremities with normal bulk and tone.  Sensory: Decreased light touch pinprick temperature sensation in a glove and stocking distribution cortical function is intact.  Coordination: Finger to nose testing is performed accurately. Romberg is swaying, ambulates with a cane.  Reflexes: 1+ in the upper extremities slightly brisk in bilateral lower extremity     toes are downgoing.  Paraspinal muscle tenderness in the lumbar area    ROS:  Review of Systems   Constitutional:  Negative for appetite change, fatigue and fever.   HENT: Negative.  Negative for hearing loss, tinnitus, trouble swallowing and voice change.    Eyes: Negative.  Negative for photophobia, pain and visual disturbance.   Respiratory: Negative.  Negative for shortness of breath.    Cardiovascular: Negative.  Negative for palpitations.   Gastrointestinal: Negative.  Negative for nausea and vomiting.   Endocrine: Negative.  Negative for cold intolerance.   Genitourinary: Negative.  Negative for dysuria, frequency and urgency.   Musculoskeletal:  Negative for back pain, gait problem, myalgias, neck pain and neck stiffness.   Skin: Negative.  Negative for rash.   Allergic/Immunologic: Negative.    Neurological: Negative.  Negative for dizziness, tremors, seizures, syncope, facial asymmetry, speech difficulty, weakness, light-headedness, numbness and headaches.   Hematological: Negative.  Does not bruise/bleed easily.   Psychiatric/Behavioral: Negative.  Negative for confusion, hallucinations and sleep disturbance.

## 2024-05-13 ENCOUNTER — TELEPHONE (OUTPATIENT)
Age: 69
End: 2024-05-13

## 2024-05-13 NOTE — TELEPHONE ENCOUNTER
Patient called to cancel his consult visit for tomorrow as the locations we offer are too far for him to get transportation. None of the locations are within distance to request Lyft. Patients appt is cancelled. Thank you.    Interp#[phone] Yadi

## 2024-05-13 NOTE — TELEPHONE ENCOUNTER
: Christopher [phone]     Patient called in requesting transportation , patient stated he will call the [employer] to see if they can provide transportation to his appointment tomorrow . If they do not provide transportation pt will give us a call back . And if we can provide transportation please advise     Armando is a Swedish speaking pt .

## 2024-05-21 ENCOUNTER — TELEPHONE (OUTPATIENT)
Dept: OBGYN CLINIC | Facility: CLINIC | Age: 69
End: 2024-05-21

## 2024-05-21 ENCOUNTER — TELEPHONE (OUTPATIENT)
Dept: NEUROLOGY | Facility: CLINIC | Age: 69
End: 2024-05-21

## 2024-05-21 NOTE — TELEPHONE ENCOUNTER
Cancelled 7/19 appointment as provider will not be in the office. Patient already scheduled for 9/25 so keeping that appointment. Please let patient know if they call in. If sooner appointment is requested, please schedule accordingly.

## 2024-05-22 ENCOUNTER — OFFICE VISIT (OUTPATIENT)
Dept: OBGYN CLINIC | Facility: CLINIC | Age: 69
End: 2024-05-22
Payer: COMMERCIAL

## 2024-05-22 VITALS
SYSTOLIC BLOOD PRESSURE: 121 MMHG | WEIGHT: 175 LBS | BODY MASS INDEX: 28.12 KG/M2 | HEIGHT: 66 IN | HEART RATE: 56 BPM | DIASTOLIC BLOOD PRESSURE: 69 MMHG

## 2024-05-22 DIAGNOSIS — M48.062 SPINAL STENOSIS OF LUMBAR REGION WITH NEUROGENIC CLAUDICATION: Primary | ICD-10-CM

## 2024-05-22 DIAGNOSIS — Z98.1 HISTORY OF LUMBAR FUSION: ICD-10-CM

## 2024-05-22 PROCEDURE — 99213 OFFICE O/P EST LOW 20 MIN: CPT | Performed by: ORTHOPAEDIC SURGERY

## 2024-05-22 NOTE — PROGRESS NOTES
[facility] ORTHOPEDIC SPINE SURGERY  DR.AMIR REED MD  [address] [address]  [phone]    HISTORY OF PRESENT ILLNESS:    Armando Saini is a 68 y.o. 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 The patient was last seen in the office on 12/20/2023 where patient was doing well and advised to follow-up in 3 months for myelopathy check. Patient reports he is having pain in the low back that is a stabbing type pain. Patient states pain has remained the same since last visit. Patient reports he continues to have numbness in his feet. Patient last did physical therapy in the pool about 4 months ago. Patient also has history of two L5-S1 epidural injections given by Dr. Prescott last year.       ALLERGIES: No Known Allergies    MEDICATIONS:    Current Outpatient Medications:     acetaminophen (TYLENOL) 325 mg tablet, Take 650 mg by mouth every 6 (six) hours as needed, Disp: , Rfl:     benzonatate (TESSALON PERLES) 100 mg capsule, Take 2 capsules (200 mg total) by mouth every 8 (eight) hours, Disp: 21 capsule, Rfl: 0    capsicum (ZOSTRIX) 0.075 % topical cream, Apply topically 3 (three) times a day, Disp: 28.3 g, Rfl: 0    cyclobenzaprine (FLEXERIL) 5 mg tablet, Take 1 tablet (5 mg total) by mouth daily at bedtime, Disp: 10 tablet, Rfl: 0    Diclofenac Sodium (VOLTAREN) 1 %, , Disp: , Rfl:     ibuprofen (MOTRIN) 600 mg tablet, Take 600 mg by mouth every 8 (eight) hours as needed, Disp: , Rfl:     ibuprofen (MOTRIN) 800 mg tablet, Take 1 tablet (800 mg total) by mouth 3 (three) times a day, Disp: 21 tablet, Rfl: 0    lidocaine (Lidoderm) 5 %, Apply 1 patch topically over 12 hours daily Remove & Discard patch within 12 hours or as directed by MD, Disp: 6 patch, Rfl: 0    pregabalin (LYRICA) 75 mg capsule, Take 1 capsule (75 mg total) by mouth 2 (two) times a day Start  by taking 1 capsule at bedtime for 2 days. Then increase to 1 capsule in the evening and 1 capsule at bedtime., Disp: 60 capsule, Rfl: 0    thiamine 100 MG tablet, Take 100 mg by mouth daily, Disp: , Rfl:      PAST MEDICAL HISTORY:   No past medical history on file.    PAST SURGICAL HISTORY:  Past Surgical History:   Procedure Laterality Date    BACK SURGERY      SPINE SURGERY      herniated discs       SOCIAL HISTORY:  Social History     Tobacco Use   Smoking Status Never   Smokeless Tobacco Never          PHYSICAL EXAM:  68 y.o. male sitting comfortably on exam chair in no apparent distress.   Ambulates with wide based unsteady gait  Unable to go up on toes and heels  Difficulty balancing on one leg  TTP over thoracolumbar spine  5/5 motor strength in bilateral lower extremities  Diminished sensation to light touch over left lower extremity compared to right   Absent achilles reflexes bilaterally  3+ patellar reflex on right   2+ patellar reflex on left  Diminished deep tendon reflexes bilateral upper extremities  Slowed rapid alternating movements bilateral lower extremities  Slightly slowed rapid alternating movements bilateral lower extremities  Negative wilson sign bilaterally       RADIOGRAPHIC STUDIES:  1.  MRI, Lumbar spine, 12/29/22: Status post L3-4 posterior spinal fusion and central laminectomy.  There is evidence of persistent spinal stenosis.  There is also evidence of severe spinal stenosis at L4-5, L5-S1.  Severe stenosis is also present at L1-2 and L2-3.  Abnormal signal is present on the spinal cord in the lower thoracic spine.  Axial images and at L1 and the thoracolumbar spine cannot be fully visualized.  2. X-rays, Lumbar spine, 11/16/2021: Multilevel lumbar degenerative disc and facet arthrosis.  Lumbarization of the S1 vertebral body with bilateral Bertolotti articulation.  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  Degenerative spondylolisthesis L5-S1.  3. X-rays, lumbar spine, 3/1/2023: Multilevel lumbar  degenerative disc and facet arthrosis.  There is evidence of posterior laminectomy at L3-4 in addition to posterior instrumentation with very long rods.  There is no indication of instrumentation or decompression at L4-5.  There is progression of spondylolisthesis compared to prior films from 2021.  4. CT, lumbar spine, 7/12/23: Moderate to severe multilevel lumbar degenerative disc disease.  There is evidence of posterior spinal fusion at L3-4.  The decompression has been performed centrally.  The facets are primarily intact and there is evidence of lateral recess stenosis present.  Severe stenosis at L4-5 and to lesser extent at other levels of the lumbar spine.     5. MRI, Thoracic spine, 9/11/23: Multilevel thoracic degenerative disc disease.  No evidence of spinal cord compression or flattening.  No some myelomalacia.  There is 2 areas where the canal is slightly stenotic but this is not clinically significant.  6. MRI, Lumbar spine, 9/18/23: Transitional anatomy at the lumbosacral junction.  There is a unilateral Bertolotti at L5-S1.  L5-S1 disc is normal.  There is however evidence of facet arthrosis at L5-S1 resulting in mild to moderate lateral recess stenosis.  At L4-5 there is evidence of degenerative spondylosis with severe central and lateral recess stenosis.  At L3-4 there is evidence of prior central laminectomy.  Persistent moderate lateral recess stenosis is present.  There is evidence of instrumentation at that level.  At L2-3 there is evidence of moderate lateral recess stenosis.  And at L1-2 there is evidence of mild to moderate lateral recess stenosis.      ASSESSMENT:  1. Spinal stenosis of lumbar region with neurogenic claudication  2. History of lumbar fusion      PLAN:  68 y.o. male with lumbar spinal stenosis, history of lumbar fusion, lumbar spondylolisthesis.      Treatment options for lumbar spinal stenosis were discussed including physical therapy, injections, and surgery. Patient has  tried physical therapy and injections in the past. Patient is not interested in surgery at this time. Script for aqua therapy was provided.    We also discussed disability.  Is a 68-year-old dividual was wondering if he could apply for disability.  I did answer his questions.  He does have significant issues respect his lumbar spine that does affect his function and does prevent him from performing activities that require prolonged walking and standing.  Unfortunate is not much I can do about his disability application.    Patient will follow-up in 6 months for routine myelopathy check.        Scribe Attestation      I,:  Kaila Bernard PA-C am acting as a scribe while in the presence of the attending physician.:       I,:  Erica Fam MD personally performed the services described in this documentation    as scribed in my presence.:

## 2024-05-29 ENCOUNTER — HOSPITAL ENCOUNTER (EMERGENCY)
Facility: HOSPITAL | Age: 69
Discharge: HOME | End: 2024-05-29
Attending: EMERGENCY MEDICINE
Payer: COMMERCIAL

## 2024-05-29 ENCOUNTER — APPOINTMENT (EMERGENCY)
Dept: RADIOLOGY | Facility: HOSPITAL | Age: 69
End: 2024-05-29
Payer: COMMERCIAL

## 2024-05-29 VITALS
HEART RATE: 53 BPM | DIASTOLIC BLOOD PRESSURE: 63 MMHG | OXYGEN SATURATION: 97 % | SYSTOLIC BLOOD PRESSURE: 137 MMHG | RESPIRATION RATE: 18 BRPM | TEMPERATURE: 97.3 F | WEIGHT: 175.04 LBS | BODY MASS INDEX: 28.25 KG/M2

## 2024-05-29 DIAGNOSIS — M25.562 ACUTE PAIN OF LEFT KNEE: Primary | ICD-10-CM

## 2024-05-29 PROCEDURE — 99283 EMERGENCY DEPT VISIT LOW MDM: CPT

## 2024-05-29 PROCEDURE — 96372 THER/PROPH/DIAG INJ SC/IM: CPT

## 2024-05-29 PROCEDURE — 73564 X-RAY EXAM KNEE 4 OR MORE: CPT

## 2024-05-29 PROCEDURE — 99284 EMERGENCY DEPT VISIT MOD MDM: CPT | Performed by: PHYSICIAN ASSISTANT

## 2024-05-29 RX ORDER — IBUPROFEN 600 MG/1
600 TABLET ORAL EVERY 6 HOURS PRN
Qty: 30 TABLET | Refills: 0 | Status: SHIPPED | OUTPATIENT
Start: 2024-05-29 | End: 2024-05-29

## 2024-05-29 RX ORDER — KETOROLAC TROMETHAMINE 30 MG/ML
15 INJECTION, SOLUTION INTRAMUSCULAR; INTRAVENOUS ONCE
Status: COMPLETED | OUTPATIENT
Start: 2024-05-29 | End: 2024-05-29

## 2024-05-29 RX ORDER — IBUPROFEN 600 MG/1
600 TABLET ORAL EVERY 6 HOURS PRN
Qty: 30 TABLET | Refills: 0 | Status: SHIPPED | OUTPATIENT
Start: 2024-05-29

## 2024-05-29 RX ADMIN — KETOROLAC TROMETHAMINE 15 MG: 30 INJECTION, SOLUTION INTRAMUSCULAR; INTRAVENOUS at 10:37

## 2024-05-29 NOTE — Clinical Note
Armando Saini was seen and treated in our emergency department on 5/29/2024.    No restrictions            Diagnosis:     Armando  may return to work on return date.    He may return on this date: 06/03/2024         If you have any questions or concerns, please don't hesitate to call.      Silvia Fung PA-C    ______________________________           _______________          _______________  Hospital Representative                              Date                                Time

## 2024-05-29 NOTE — ED PROVIDER NOTES
History  Chief Complaint   Patient presents with    Knee Pain     Left knee pain, after lifting on Sunday.      68-year-old male presents the emergency department with complaints of left-sided knee pain.  States that he started to experience some pain in the front of the left knee 4 days ago after lifting some heavy objects and debridement of a truck.  States the pain has continued since that time.  States the pain is worse with weightbearing and relieved with rest.  Describes a sharp pain in the front of the knee.  Denies previous injury to this knee.  Denies any kneeling or repetitive movements.      History provided by:  Patient   used: No    Knee Pain  Location:  Knee  Time since incident:  4 days  Knee location:  L knee  Pain details:     Quality:  Sharp    Severity:  Moderate    Onset quality:  Gradual    Duration:  4 days    Progression:  Unchanged  Chronicity:  New  Prior injury to area:  No  Relieved by:  Rest  Worsened by:  Bearing weight  Ineffective treatments:  Acetaminophen  Associated symptoms: decreased ROM    Associated symptoms: no back pain, no fatigue, no fever, no itching, no muscle weakness, no neck pain, no numbness, no stiffness, no swelling and no tingling        Prior to Admission Medications   Prescriptions Last Dose Informant Patient Reported? Taking?   Diclofenac Sodium (VOLTAREN) 1 %  Self, Spouse/Significant Other Yes No   Patient not taking: Reported on 10/9/2023   acetaminophen (TYLENOL) 325 mg tablet  Self, Spouse/Significant Other Yes No   Sig: Take 650 mg by mouth every 6 (six) hours as needed   benzonatate (TESSALON PERLES) 100 mg capsule  Self, Spouse/Significant Other No No   Sig: Take 2 capsules (200 mg total) by mouth every 8 (eight) hours   Patient not taking: Reported on 5/22/2024   capsicum (ZOSTRIX) 0.075 % topical cream  Self, Spouse/Significant Other No No   Sig: Apply topically 3 (three) times a day   cyclobenzaprine (FLEXERIL) 5 mg tablet  Self,  Spouse/Significant Other No No   Sig: Take 1 tablet (5 mg total) by mouth daily at bedtime   Patient not taking: Reported on 5/22/2024   ibuprofen (MOTRIN) 600 mg tablet  Self, Spouse/Significant Other Yes No   Sig: Take 600 mg by mouth every 8 (eight) hours as needed   Patient not taking: Reported on 5/22/2024   ibuprofen (MOTRIN) 800 mg tablet  Self, Spouse/Significant Other No No   Sig: Take 1 tablet (800 mg total) by mouth 3 (three) times a day   Patient not taking: Reported on 5/22/2024   lidocaine (Lidoderm) 5 %  Self, Spouse/Significant Other No No   Sig: Apply 1 patch topically over 12 hours daily Remove & Discard patch within 12 hours or as directed by MD   Patient not taking: Reported on 5/22/2024   pregabalin (LYRICA) 75 mg capsule  Self, Spouse/Significant Other No No   Sig: Take 1 capsule (75 mg total) by mouth 2 (two) times a day Start by taking 1 capsule at bedtime for 2 days. Then increase to 1 capsule in the evening and 1 capsule at bedtime.   Patient not taking: Reported on 5/22/2024   thiamine 100 MG tablet  Self, Spouse/Significant Other Yes No   Sig: Take 100 mg by mouth daily   Patient not taking: Reported on 5/22/2024      Facility-Administered Medications: None       History reviewed. No pertinent past medical history.    Past Surgical History:   Procedure Laterality Date    BACK SURGERY      SPINE SURGERY      herniated discs       History reviewed. No pertinent family history.  I have reviewed and agree with the history as documented.    E-Cigarette/Vaping    E-Cigarette Use Never User      E-Cigarette/Vaping Substances     Social History     Tobacco Use    Smoking status: Never    Smokeless tobacco: Never   Vaping Use    Vaping status: Never Used   Substance Use Topics    Alcohol use: Never    Drug use: Never       Review of Systems   Constitutional:  Negative for chills, fatigue and fever.   Respiratory:  Negative for cough.    Cardiovascular:  Negative for chest pain.   Musculoskeletal:   Positive for arthralgias (knee pain). Negative for back pain, neck pain and stiffness.   Skin:  Negative for itching, rash and wound.   All other systems reviewed and are negative.      Physical Exam  Physical Exam  Vitals and nursing note reviewed.   Constitutional:       Appearance: He is well-developed.   HENT:      Head: Normocephalic and atraumatic.   Cardiovascular:      Rate and Rhythm: Normal rate and regular rhythm.   Pulmonary:      Effort: Pulmonary effort is normal. No respiratory distress.      Breath sounds: No wheezing, rhonchi or rales.   Musculoskeletal:      Left knee: No swelling, deformity, effusion, erythema or ecchymosis. Decreased range of motion. Tenderness present over the patellar tendon.   Skin:     General: Skin is warm and dry.   Neurological:      General: No focal deficit present.      Mental Status: He is alert and oriented to person, place, and time.   Psychiatric:         Mood and Affect: Mood normal.         Behavior: Behavior normal.         Vital Signs  ED Triage Vitals [05/29/24 1013]   Temperature Pulse Respirations Blood Pressure SpO2   (!) 97.3 °F (36.3 °C) (!) 53 18 137/63 97 %      Temp src Heart Rate Source Patient Position - Orthostatic VS BP Location FiO2 (%)   -- Monitor -- -- --      Pain Score       --           Vitals:    05/29/24 1013   BP: 137/63   Pulse: (!) 53         Visual Acuity      ED Medications  Medications   ketorolac (TORADOL) injection 15 mg (15 mg Intramuscular Given 5/29/24 1037)       Diagnostic Studies  Results Reviewed       None                   XR knee 4+ views left injury   ED Interpretation by Silvia Fung PA-C (05/29 1135)   Mild arthritic changes.  No fracture.                 Procedures  Procedures         ED Course                                             Medical Decision Making  Differential diagnosis includes but not limited to: Tendinitis, bursitis, arthritis, soft tissue injury    Problems Addressed:  Acute pain of left knee:  acute illness or injury    Amount and/or Complexity of Data Reviewed  Radiology: ordered and independent interpretation performed. Decision-making details documented in ED Course.    Risk  Prescription drug management.             Disposition  Final diagnoses:   Acute pain of left knee     Time reflects when diagnosis was documented in both MDM as applicable and the Disposition within this note       Time User Action Codes Description Comment    5/29/2024 11:36 AM Silvia Fung [M25.562] Acute pain of left knee           ED Disposition       ED Disposition   Discharge    Condition   Stable    Date/Time   Wed May 29, 2024 11:36 AM    Comment   Armando Saini discharge to home/self care.                   Follow-up Information       Follow up With Specialties Details Why Contact Info Additional Information    [facility]   [address]  [address]  [facility] [address] [phone] [facility] [place], [facility] [address], [place], [address], [phone]            Patient's Medications   Discharge Prescriptions    IBUPROFEN (MOTRIN) 600 MG TABLET    Take 1 tablet (600 mg total) by mouth every 6 (six) hours as needed for mild pain       Start Date: 5/29/2024 End Date: --       Order Dose: 600 mg       Quantity: 30 tablet    Refills: 0           PDMP Review         Value Time User    PDMP Reviewed  Yes 7/19/2023  8:48 AM Anupam Prescott MD            ED Provider  Electronically Signed by             Silvia Fung PA-C  05/29/24 2004

## 2024-06-06 VITALS
WEIGHT: 171.4 LBS | SYSTOLIC BLOOD PRESSURE: 130 MMHG | BODY MASS INDEX: 27.55 KG/M2 | HEIGHT: 66 IN | DIASTOLIC BLOOD PRESSURE: 77 MMHG | HEART RATE: 58 BPM

## 2024-06-06 DIAGNOSIS — M17.12 PRIMARY OSTEOARTHRITIS OF LEFT KNEE: Primary | ICD-10-CM

## 2024-06-06 DIAGNOSIS — M25.562 ACUTE PAIN OF LEFT KNEE: ICD-10-CM

## 2024-06-06 PROCEDURE — 99214 OFFICE O/P EST MOD 30 MIN: CPT | Performed by: ORTHOPAEDIC SURGERY

## 2024-06-06 PROCEDURE — 20610 DRAIN/INJ JOINT/BURSA W/O US: CPT | Performed by: ORTHOPAEDIC SURGERY

## 2024-06-06 RX ORDER — BUPIVACAINE HYDROCHLORIDE 2.5 MG/ML
2 INJECTION, SOLUTION INFILTRATION; PERINEURAL
Status: COMPLETED | OUTPATIENT
Start: 2024-06-06 | End: 2024-06-06

## 2024-06-06 RX ORDER — METHYLPREDNISOLONE ACETATE 40 MG/ML
2 INJECTION, SUSPENSION INTRA-ARTICULAR; INTRALESIONAL; INTRAMUSCULAR; SOFT TISSUE
Status: COMPLETED | OUTPATIENT
Start: 2024-06-06 | End: 2024-06-06

## 2024-06-06 RX ADMIN — METHYLPREDNISOLONE ACETATE 2 ML: 40 INJECTION, SUSPENSION INTRA-ARTICULAR; INTRALESIONAL; INTRAMUSCULAR; SOFT TISSUE at 08:45

## 2024-06-06 RX ADMIN — BUPIVACAINE HYDROCHLORIDE 2 ML: 2.5 INJECTION, SOLUTION INFILTRATION; PERINEURAL at 08:45

## 2024-06-06 NOTE — PROGRESS NOTES
Orthopaedics Office Visit - 1st Patient Visit    ASSESSMENT/PLAN:    Assessment:   Left knee osteoarthritis,  severe   Stiffness left knee       Plan:   Discussed conservative treatment with patient at length  Weight bearing as tolerated left lower extremity   Offered patient cortisone injection. Patient accepted and tolerated well.   Discussed that cortisone injections can be repeated every 3 months as needed  Begin physical therapy as directed   Maintain hinged knee brace as directed   Over the counter analgesics as needed / directed   Ice / heat as directed    Follow up 6 weeks       To Do Next Visit:  Evaluate left knee pain     _____________________________________________________  CHIEF COMPLAINT:  Chief Complaint   Patient presents with    Left Knee - Pain         SUBJECTIVE:  Armando Saini is a 68 y.o. male who presents to the office for initial evaluation of his left knee.  Patient states that his symptoms have been ongoing past 1 to 2 weeks in duration no injury of note.  Patient states that his knee pain is severe and becomes worse with weightbearing, range of motion of the knee.  Patient states that his pain is predominantly in the medial and anterior aspect of the knee.  Patient was seen and evaluated at the emergency room on 5/29/2024 secondary to severe knee pain where x-rays were taken.  Patient takes over-the-counter pain medication as needed for pain.  Patient denies any prior history of left knee pain or injuries.  Patient denies any prior surgical history of the left knee.  Patient denies any popping locking or clicking in the knee.  Patient offers no other complaints at this time.      PAST MEDICAL HISTORY:  History reviewed. No pertinent past medical history.      PAST SURGICAL HISTORY:  Past Surgical History:   Procedure Laterality Date    BACK SURGERY      SPINE SURGERY      herniated discs       FAMILY HISTORY:  History reviewed. No pertinent family history.    SOCIAL HISTORY:  Social  History     Tobacco Use    Smoking status: Never    Smokeless tobacco: Never   Vaping Use    Vaping status: Never Used   Substance Use Topics    Alcohol use: Never    Drug use: Never       MEDICATIONS:    Current Outpatient Medications:     acetaminophen (TYLENOL) 325 mg tablet, Take 650 mg by mouth every 6 (six) hours as needed, Disp: , Rfl:     ibuprofen (MOTRIN) 600 mg tablet, Take 1 tablet (600 mg total) by mouth every 6 (six) hours as needed for mild pain, Disp: 30 tablet, Rfl: 0    benzonatate (TESSALON PERLES) 100 mg capsule, Take 2 capsules (200 mg total) by mouth every 8 (eight) hours, Disp: 21 capsule, Rfl: 0    capsicum (ZOSTRIX) 0.075 % topical cream, Apply topically 3 (three) times a day, Disp: 28.3 g, Rfl: 0    cyclobenzaprine (FLEXERIL) 5 mg tablet, Take 1 tablet (5 mg total) by mouth daily at bedtime, Disp: 10 tablet, Rfl: 0    Diclofenac Sodium (VOLTAREN) 1 %, , Disp: , Rfl:     ibuprofen (MOTRIN) 600 mg tablet, Take 600 mg by mouth every 8 (eight) hours as needed, Disp: , Rfl:     ibuprofen (MOTRIN) 800 mg tablet, Take 1 tablet (800 mg total) by mouth 3 (three) times a day, Disp: 21 tablet, Rfl: 0    lidocaine (Lidoderm) 5 %, Apply 1 patch topically over 12 hours daily Remove & Discard patch within 12 hours or as directed by MD, Disp: 6 patch, Rfl: 0    pregabalin (LYRICA) 75 mg capsule, Take 1 capsule (75 mg total) by mouth 2 (two) times a day Start by taking 1 capsule at bedtime for 2 days. Then increase to 1 capsule in the evening and 1 capsule at bedtime., Disp: 60 capsule, Rfl: 0    thiamine 100 MG tablet, Take 100 mg by mouth daily, Disp: , Rfl:     ALLERGIES:  No Known Allergies    REVIEW OF SYSTEMS:  MSK: left knee pain   Neuro: WNL   Pertinent items are otherwise noted in HPI.  A comprehensive review of systems was otherwise negative.    LABS:  HgA1c:   Lab Results   Component Value Date    HGBA1C 5.3 11/17/2022     BMP:   Lab Results   Component Value Date    CALCIUM 9.0 11/17/2022    K  "4.4 11/17/2022    CO2 30 11/17/2022     11/17/2022    BUN 19 11/17/2022    CREATININE 0.76 11/17/2022     CBC: No components found for: \"CBC\"    _____________________________________________________  PHYSICAL EXAMINATION:  Vital signs: /77   Pulse 58   Ht 5' 6\" (1.676 m)   Wt 77.7 kg (171 lb 6.4 oz)   BMI 27.66 kg/m²   General: No acute distress, awake and alert  Psychiatric: Mood and affect appear appropriate  HEENT: Trachea Midline, No torticollis, no apparent facial trauma  Cardiovascular: No audible murmurs; Extremities appear perfused  Pulmonary: No audible wheezing or stridor  Skin: No open lesions; see further details (if any) below      MUSCULOSKELETAL EXAMINATION:  Left knee examination:  Patient sitting comfortably in the office in no apparent distress   No acute visible abnormalities present in the left knee.   TTP noted over the medial and lateral knee joints   Patient lacks the last 20 degrees of extension,  90 degrees of flexion   NV intact    _____________________________________________________  STUDIES REVIEWED:  I personally reviewed the images and interpretation is as follows:  Left knee XR 3 views :  IMPRESSION:  Moderate to severe medial compartment arthritis and severe patellofemoral arthritis. Moderate lateral compartment arthritis        PROCEDURES PERFORMED:  Large joint arthrocentesis: L knee  Universal Protocol:  Consent: Verbal consent obtained.  Risks and benefits: risks, benefits and alternatives were discussed  Consent given by: patient  Patient understanding: patient states understanding of the procedure being performed  Site marked: the operative site was marked  Patient identity confirmed: verbally with patient  Supporting Documentation  Indications: pain   Procedure Details  Location: knee - L knee  Preparation: Patient was prepped and draped in the usual sterile fashion  Needle size: 22 G  Ultrasound guidance: no  Approach: anterolateral  Medications administered: " "2 mL bupivacaine 0.25 %; 2 mL methylPREDNISolone acetate 40 mg/mL    Patient tolerance: patient tolerated the procedure well with no immediate complications  Dressing:  Sterile dressing applied                    Rufus Ramírez PA-C - assisting  Connor Zamorano MD                                Portions of the record may have been created with voice recognition software.  Occasional wrong word or \"sound a like\" substitutions may have occurred due to the inherent limitations of voice recognition software.  Read the chart carefully and recognize, using context, where substitutions have occurred.    "

## 2024-06-06 NOTE — PATIENT INSTRUCTIONS
Discussed conservative treatment with patient at length  Weight bearing as tolerated left lower extremity   Offered patient cortisone injection. Patient accepted and tolerated well.   Discussed that cortisone injections can be repeated every 3 months as needed  Begin physical therapy as directed   Maintain hinged knee brace as directed   Over the counter analgesics as needed / directed   Ice / heat as directed    Follow up 6 weeks

## 2024-09-18 ENCOUNTER — TELEPHONE (OUTPATIENT)
Dept: NEUROLOGY | Facility: CLINIC | Age: 69
End: 2024-09-18

## 2024-09-18 NOTE — TELEPHONE ENCOUNTER
LVM for patient's sister to confirm upcoming appointment with Dr. Lincoln. Patient phone number no longer in service.

## 2024-09-24 ENCOUNTER — TELEPHONE (OUTPATIENT)
Dept: NEUROLOGY | Facility: CLINIC | Age: 69
End: 2024-09-24

## 2024-09-24 NOTE — TELEPHONE ENCOUNTER
Not able to confirm appointment for 9/25/24 at 10 AM with Dr. Lincoln.  Number has been disconnected.

## 2024-10-22 ENCOUNTER — TELEPHONE (OUTPATIENT)
Age: 69
End: 2024-10-22

## 2024-10-22 NOTE — TELEPHONE ENCOUNTER
Called pt sister's letting her know that the pt's appt was moved from 11/25 to 12/02, it'll be at the same time as the last one 1:30pm. She will let him know. Pts phone was deactivated.

## 2025-04-07 ENCOUNTER — HOSPITAL ENCOUNTER (EMERGENCY)
Facility: HOSPITAL | Age: 70
Discharge: HOME | End: 2025-04-07
Attending: EMERGENCY MEDICINE

## 2025-04-07 VITALS
DIASTOLIC BLOOD PRESSURE: 70 MMHG | SYSTOLIC BLOOD PRESSURE: 135 MMHG | RESPIRATION RATE: 16 BRPM | TEMPERATURE: 97.9 F | HEART RATE: 67 BPM | OXYGEN SATURATION: 96 %

## 2025-04-07 DIAGNOSIS — H00.015 HORDEOLUM EXTERNUM OF LEFT LOWER EYELID: Primary | ICD-10-CM

## 2025-04-07 PROCEDURE — 99282 EMERGENCY DEPT VISIT SF MDM: CPT

## 2025-04-07 PROCEDURE — 99284 EMERGENCY DEPT VISIT MOD MDM: CPT | Performed by: PHYSICIAN ASSISTANT

## 2025-04-07 RX ORDER — ERYTHROMYCIN 5 MG/G
OINTMENT OPHTHALMIC
Qty: 3.5 G | Refills: 0 | Status: SHIPPED | OUTPATIENT
Start: 2025-04-07

## 2025-04-07 RX ORDER — ERYTHROMYCIN 5 MG/G
0.5 OINTMENT OPHTHALMIC ONCE
Status: COMPLETED | OUTPATIENT
Start: 2025-04-07 | End: 2025-04-07

## 2025-04-07 RX ADMIN — ERYTHROMYCIN 0.5 INCH: 5 OINTMENT OPHTHALMIC at 11:42

## 2025-04-07 NOTE — ED PROVIDER NOTES
Time reflects when diagnosis was documented in both MDM as applicable and the Disposition within this note       Time User Action Codes Description Comment    4/7/2025 11:38 AM Dc Deleon Add [H00.015] Hordeolum externum of left lower eyelid           ED Disposition       ED Disposition   Discharge    Condition   Stable    Date/Time   Mon Apr 7, 2025 11:38 AM    Comment   Armando Reido discharge to home/self care.                   Assessment & Plan       Medical Decision Making  69-year-old male presenting to the emergency department today for evaluation of a stye to the left lower lid that has been going on for last 4 days.  Has not tried any at home make it better.  History of stye and it feels similar.  He declines any injury.  Denies any fevers or chills.  Denies any visual disturbances but does state that his eye does get blurry vision when it starts tearing.  He denies any globe pain.  No pain on palpation of the periorbital region or the upper lid.  Localized hordeolum to the left lower lid.  He does not wear contact lenses.  Conjunctive clear bilaterally.  EOMs intact without any diplopia or pain on extraocular eye movements.  Do not suspect preseptal or septal cellulitis here today.  He is normotensive, afebrile, and oxygen saturation on room air 96% and adequate with any respiratory stress or tachypnea.  Erythromycin ointment was given here today.  There was sent to the pharmacy.  ED return precaution discussed with him at bedside he verbalized understanding.  Advised him to call his eye doctor for the follow-up.    Risk  Prescription drug management.        ED Course as of 04/07/25 1526   Mon Apr 07, 2025   1137 The patient does not have any decrease in vision.  Gross visual acuity is intact.  EOMs are intact.  No diplopia.  No pain on extraocular eye movements.  No signs of septal or preseptal cellulitis.  Localized lower lid hordeolum on the left side.  Advised more warm compresses 4 times a  day for 15 minutes each.  Antibiotic ointment prescribed.  Will follow-up with the family care provider in 2 days for reevaluation.  No facial swelling.  No facial edema.  No tenderness to the upper lid or globe tenderness.  No signs of complicated infection.  No injury.       Medications   erythromycin (ILOTYCIN) 0.5 % ophthalmic ointment 0.5 inch (0.5 inches Left Eye Given 4/7/25 1142)       ED Risk Strat Scores                                                History of Present Illness       Chief Complaint   Patient presents with    Stye     Left eye stye x 4 days with drainage and blurred vision        History reviewed. No pertinent past medical history.   Past Surgical History:   Procedure Laterality Date    BACK SURGERY      SPINE SURGERY      herniated discs      History reviewed. No pertinent family history.   Social History     Tobacco Use    Smoking status: Never    Smokeless tobacco: Never   Vaping Use    Vaping status: Never Used   Substance Use Topics    Alcohol use: Never    Drug use: Never      E-Cigarette/Vaping    E-Cigarette Use Never User       E-Cigarette/Vaping Substances      I have reviewed and agree with the history as documented.       History provided by:  Patient   used: No    Eye Problem  Location:  Left eye  Quality: lower lid redness and bump.  Severity:  Mild  Onset quality:  Gradual  Duration:  4 days  Chronicity:  New  Context: not burn, not chemical exposure, not contact lens problem, not direct trauma, not foreign body and not scratch    Relieved by:  None tried  Exacerbated by: pressing lower lid.  Associated symptoms: no discharge, no itching and no redness    Associated symptoms comment:  States he gets blurry when his eye tears but clears with blinking        Review of Systems   Constitutional:  Negative for chills and fever.   Eyes:  Negative for pain, discharge, redness and itching.   All other systems reviewed and are negative.          Objective       ED  Triage Vitals [04/07/25 1008]   Temperature Pulse Blood Pressure Respirations SpO2 Patient Position - Orthostatic VS   97.9 °F (36.6 °C) 67 135/70 16 96 % Sitting      Temp Source Heart Rate Source BP Location FiO2 (%) Pain Score    Temporal Monitor Left arm -- --      Vitals      Date and Time Temp Pulse SpO2 Resp BP Pain Score FACES Pain Rating User   04/07/25 1008 97.9 °F (36.6 °C) 67 96 % 16 135/70 -- -- MR            Physical Exam  Vitals and nursing note reviewed.   Constitutional:       General: He is not in acute distress.     Appearance: Normal appearance. He is well-developed. He is not ill-appearing or toxic-appearing.   HENT:      Head: Normocephalic and atraumatic.   Eyes:      General:         Left eye: Hordeolum present.     Extraocular Movements:      Right eye: Normal extraocular motion and no nystagmus.      Left eye: Normal extraocular motion and no nystagmus.      Conjunctiva/sclera: Conjunctivae normal.        Comments: Small hordeolum   No abscess  No conjunctival injection  Normal EOMs without diplopia or pain on EOMs     Cardiovascular:      Rate and Rhythm: Normal rate and regular rhythm.      Heart sounds: No murmur heard.  Pulmonary:      Effort: Pulmonary effort is normal. No respiratory distress.      Breath sounds: Normal breath sounds.   Abdominal:      Palpations: Abdomen is soft.      Tenderness: There is no abdominal tenderness.   Musculoskeletal:         General: No swelling.      Cervical back: Neck supple.   Skin:     General: Skin is warm and dry.      Capillary Refill: Capillary refill takes less than 2 seconds.   Neurological:      Mental Status: He is alert.   Psychiatric:         Mood and Affect: Mood normal.         Results Reviewed       None            No orders to display       Procedures    ED Medication and Procedure Management   Prior to Admission Medications   Prescriptions Last Dose Informant Patient Reported? Taking?   Diclofenac Sodium (VOLTAREN) 1 %  Self Yes No    acetaminophen (TYLENOL) 325 mg tablet  Self Yes No   Sig: Take 650 mg by mouth every 6 (six) hours as needed   benzonatate (TESSALON PERLES) 100 mg capsule  Self No No   Sig: Take 2 capsules (200 mg total) by mouth every 8 (eight) hours   capsicum (ZOSTRIX) 0.075 % topical cream  Self No No   Sig: Apply topically 3 (three) times a day   cyclobenzaprine (FLEXERIL) 5 mg tablet  Self No No   Sig: Take 1 tablet (5 mg total) by mouth daily at bedtime   ibuprofen (MOTRIN) 600 mg tablet  Self Yes No   Sig: Take 600 mg by mouth every 8 (eight) hours as needed   ibuprofen (MOTRIN) 600 mg tablet  Self No No   Sig: Take 1 tablet (600 mg total) by mouth every 6 (six) hours as needed for mild pain   ibuprofen (MOTRIN) 800 mg tablet  Self No No   Sig: Take 1 tablet (800 mg total) by mouth 3 (three) times a day   lidocaine (Lidoderm) 5 %  Self No No   Sig: Apply 1 patch topically over 12 hours daily Remove & Discard patch within 12 hours or as directed by MD   pregabalin (LYRICA) 75 mg capsule  Self No No   Sig: Take 1 capsule (75 mg total) by mouth 2 (two) times a day Start by taking 1 capsule at bedtime for 2 days. Then increase to 1 capsule in the evening and 1 capsule at bedtime.   thiamine 100 MG tablet  Self Yes No   Sig: Take 100 mg by mouth daily      Facility-Administered Medications: None     Discharge Medication List as of 4/7/2025 11:46 AM        START taking these medications    Details   erythromycin (ILOTYCIN) ophthalmic ointment Place a 1/2 inch ribbon of ointment into the lower eyelid every 6 hours, Normal           CONTINUE these medications which have NOT CHANGED    Details   acetaminophen (TYLENOL) 325 mg tablet Take 650 mg by mouth every 6 (six) hours as needed, Historical Med      benzonatate (TESSALON PERLES) 100 mg capsule Take 2 capsules (200 mg total) by mouth every 8 (eight) hours, Starting Mon 1/8/2024, Normal      capsicum (ZOSTRIX) 0.075 % topical cream Apply topically 3 (three) times a day,  Starting Wed 7/19/2023, Normal      cyclobenzaprine (FLEXERIL) 5 mg tablet Take 1 tablet (5 mg total) by mouth daily at bedtime, Starting Tue 10/25/2022, Normal      Diclofenac Sodium (VOLTAREN) 1 % Starting Sat 7/15/2023, Historical Med      !! ibuprofen (MOTRIN) 600 mg tablet Take 600 mg by mouth every 8 (eight) hours as needed, Starting Fri 10/27/2023, Historical Med      !! ibuprofen (MOTRIN) 600 mg tablet Take 1 tablet (600 mg total) by mouth every 6 (six) hours as needed for mild pain, Starting Wed 5/29/2024, Normal      !! ibuprofen (MOTRIN) 800 mg tablet Take 1 tablet (800 mg total) by mouth 3 (three) times a day, Starting Mon 1/8/2024, Normal      lidocaine (Lidoderm) 5 % Apply 1 patch topically over 12 hours daily Remove & Discard patch within 12 hours or as directed by MD, Starting Wed 7/12/2023, Normal      pregabalin (LYRICA) 75 mg capsule Take 1 capsule (75 mg total) by mouth 2 (two) times a day Start by taking 1 capsule at bedtime for 2 days. Then increase to 1 capsule in the evening and 1 capsule at bedtime., Starting Wed 7/19/2023, Normal      thiamine 100 MG tablet Take 100 mg by mouth daily, Starting Fri 10/27/2023, Until Sat 10/26/2024, Historical Med       !! - Potential duplicate medications found. Please discuss with provider.        No discharge procedures on file.  ED SEPSIS DOCUMENTATION   Time reflects when diagnosis was documented in both MDM as applicable and the Disposition within this note       Time User Action Codes Description Comment    4/7/2025 11:38 AM Dc Deleon Add [H00.015] Hordeolum externum of left lower eyelid                  Dc Deleon PA-C  04/07/25 2728